# Patient Record
Sex: FEMALE | Race: WHITE | Employment: OTHER | URBAN - METROPOLITAN AREA
[De-identification: names, ages, dates, MRNs, and addresses within clinical notes are randomized per-mention and may not be internally consistent; named-entity substitution may affect disease eponyms.]

---

## 2024-03-31 LAB
LEFT VENTRICULAR EJECTION FRACTION MODE: NORMAL
LV EF: 57 %

## 2024-05-04 ENCOUNTER — APPOINTMENT (OUTPATIENT)
Dept: GENERAL RADIOLOGY | Age: 74
DRG: 683 | End: 2024-05-04
Payer: MEDICARE

## 2024-05-04 ENCOUNTER — APPOINTMENT (OUTPATIENT)
Dept: CT IMAGING | Age: 74
DRG: 683 | End: 2024-05-04
Payer: MEDICARE

## 2024-05-04 ENCOUNTER — APPOINTMENT (OUTPATIENT)
Dept: VASCULAR LAB | Age: 74
DRG: 683 | End: 2024-05-04
Payer: MEDICARE

## 2024-05-04 ENCOUNTER — HOSPITAL ENCOUNTER (INPATIENT)
Age: 74
LOS: 4 days | Discharge: NURSING, REHAB OR LONG-TERM CARE | DRG: 683 | End: 2024-05-08
Attending: EMERGENCY MEDICINE | Admitting: INTERNAL MEDICINE
Payer: MEDICARE

## 2024-05-04 DIAGNOSIS — R60.0 EDEMA OF RIGHT LOWER EXTREMITY: ICD-10-CM

## 2024-05-04 DIAGNOSIS — R60.0 EDEMA OF LEFT LOWER EXTREMITY: ICD-10-CM

## 2024-05-04 DIAGNOSIS — N17.9 AKI (ACUTE KIDNEY INJURY) (HCC): Primary | ICD-10-CM

## 2024-05-04 DIAGNOSIS — I50.33 ACUTE ON CHRONIC DIASTOLIC CONGESTIVE HEART FAILURE (HCC): ICD-10-CM

## 2024-05-04 DIAGNOSIS — J18.9 PNEUMONIA OF RIGHT MIDDLE LOBE DUE TO INFECTIOUS ORGANISM: ICD-10-CM

## 2024-05-04 DIAGNOSIS — A41.9 SEPTICEMIA (HCC): ICD-10-CM

## 2024-05-04 DIAGNOSIS — R60.0 EDEMA OF RIGHT UPPER EXTREMITY: ICD-10-CM

## 2024-05-04 DIAGNOSIS — E87.1 HYPONATREMIA: ICD-10-CM

## 2024-05-04 LAB
ALBUMIN SERPL-MCNC: 4.1 G/DL (ref 3.5–5.2)
ALBUMIN/GLOB SERPL: 2 {RATIO} (ref 1–2.5)
ALP SERPL-CCNC: 100 U/L (ref 35–104)
ALT SERPL-CCNC: 23 U/L (ref 10–35)
ANION GAP SERPL CALCULATED.3IONS-SCNC: 17 MMOL/L (ref 9–16)
ANION GAP SERPL CALCULATED.3IONS-SCNC: 17 MMOL/L (ref 9–16)
ANTI-XA UNFRAC HEPARIN: >2 IU/L
AST SERPL-CCNC: 22 U/L (ref 10–35)
BASOPHILS # BLD: 0 K/UL (ref 0–0.2)
BASOPHILS NFR BLD: 0 % (ref 0–2)
BILIRUB SERPL-MCNC: 0.3 MG/DL (ref 0–1.2)
BILIRUB UR QL STRIP: NEGATIVE
BNP SERPL-MCNC: ABNORMAL PG/ML (ref 0–300)
BODY TEMPERATURE: 37
BUN SERPL-MCNC: 66 MG/DL (ref 8–23)
BUN SERPL-MCNC: 67 MG/DL (ref 8–23)
CALCIUM SERPL-MCNC: 8.6 MG/DL (ref 8.6–10.4)
CALCIUM SERPL-MCNC: 9.1 MG/DL (ref 8.6–10.4)
CASTS #/AREA URNS LPF: NORMAL /LPF (ref 0–2)
CASTS #/AREA URNS LPF: NORMAL /LPF (ref 0–2)
CHLORIDE SERPL-SCNC: 90 MMOL/L (ref 98–107)
CHLORIDE SERPL-SCNC: 95 MMOL/L (ref 98–107)
CLARITY UR: CLEAR
CO2 SERPL-SCNC: 20 MMOL/L (ref 20–31)
CO2 SERPL-SCNC: 22 MMOL/L (ref 20–31)
COHGB MFR BLD: 4.4 % (ref 0–5)
COLOR UR: YELLOW
CREAT SERPL-MCNC: 2.8 MG/DL (ref 0.5–0.9)
CREAT SERPL-MCNC: 3 MG/DL (ref 0.5–0.9)
CREAT UR-MCNC: 61.5 MG/DL (ref 28–217)
EOSINOPHIL # BLD: 0.16 K/UL (ref 0–0.4)
EOSINOPHILS RELATIVE PERCENT: 1 % (ref 1–4)
EPI CELLS #/AREA URNS HPF: NORMAL /HPF (ref 0–5)
ERYTHROCYTE [DISTWIDTH] IN BLOOD BY AUTOMATED COUNT: 15.4 % (ref 11.8–14.4)
ERYTHROCYTE [DISTWIDTH] IN BLOOD BY AUTOMATED COUNT: 15.5 % (ref 11.8–14.4)
FIO2 ON VENT: ABNORMAL %
FLUAV AG SPEC QL: NEGATIVE
FLUBV AG SPEC QL: NEGATIVE
GFR, ESTIMATED: 16 ML/MIN/1.73M2
GFR, ESTIMATED: 17 ML/MIN/1.73M2
GLUCOSE SERPL-MCNC: 114 MG/DL (ref 74–99)
GLUCOSE SERPL-MCNC: 118 MG/DL (ref 74–99)
GLUCOSE UR STRIP-MCNC: NEGATIVE MG/DL
HCO3 VENOUS: 25.1 MMOL/L (ref 24–30)
HCT VFR BLD AUTO: 29 % (ref 36.3–47.1)
HCT VFR BLD AUTO: 30.8 % (ref 36.3–47.1)
HGB BLD-MCNC: 9 G/DL (ref 11.9–15.1)
HGB BLD-MCNC: 9.9 G/DL (ref 11.9–15.1)
HGB UR QL STRIP.AUTO: NEGATIVE
IMM GRANULOCYTES # BLD AUTO: 0 K/UL (ref 0–0.3)
IMM GRANULOCYTES NFR BLD: 0 %
INR PPP: 1.2
INR PPP: 1.3
KETONES UR STRIP-MCNC: NEGATIVE MG/DL
L PNEUMO1 AG UR QL IA.RAPID: NEGATIVE
LACTIC ACID, WHOLE BLOOD: 1.1 MMOL/L (ref 0.7–2.1)
LEUKOCYTE ESTERASE UR QL STRIP: NEGATIVE
LIPASE SERPL-CCNC: 46 U/L (ref 13–60)
LYMPHOCYTES NFR BLD: 1.63 K/UL (ref 1–4.8)
LYMPHOCYTES RELATIVE PERCENT: 10 % (ref 24–44)
MCH RBC QN AUTO: 30.2 PG (ref 25.2–33.5)
MCH RBC QN AUTO: 30.2 PG (ref 25.2–33.5)
MCHC RBC AUTO-ENTMCNC: 31 G/DL (ref 28.4–34.8)
MCHC RBC AUTO-ENTMCNC: 32.1 G/DL (ref 28.4–34.8)
MCV RBC AUTO: 93.9 FL (ref 82.6–102.9)
MCV RBC AUTO: 97.3 FL (ref 82.6–102.9)
MONOCYTES NFR BLD: 1.79 K/UL (ref 0.1–0.8)
MONOCYTES NFR BLD: 11 % (ref 1–7)
MORPHOLOGY: ABNORMAL
NEUTROPHILS NFR BLD: 78 % (ref 36–66)
NEUTS SEG NFR BLD: 12.72 K/UL (ref 1.8–7.7)
NITRITE UR QL STRIP: NEGATIVE
NRBC BLD-RTO: 0 PER 100 WBC
NRBC BLD-RTO: 0 PER 100 WBC
O2 SAT, VEN: 98.1 % (ref 60–85)
OSMOLALITY SERPL: 300 MOSM/KG (ref 275–295)
OSMOLALITY UR: 389 MOSM/KG (ref 80–1300)
PARTIAL THROMBOPLASTIN TIME: 30.2 SEC (ref 23–36.5)
PCO2, VEN: 41.5 MM HG (ref 39–55)
PH UR STRIP: 5.5 [PH] (ref 5–8)
PH VENOUS: 7.4 (ref 7.32–7.42)
PLATELET # BLD AUTO: 310 K/UL (ref 138–453)
PLATELET # BLD AUTO: 347 K/UL (ref 138–453)
PMV BLD AUTO: 10.4 FL (ref 8.1–13.5)
PMV BLD AUTO: 10.7 FL (ref 8.1–13.5)
PO2, VEN: 106 MM HG (ref 30–50)
POSITIVE BASE EXCESS, VEN: 0.8 MMOL/L (ref 0–2)
POTASSIUM SERPL-SCNC: 4 MMOL/L (ref 3.7–5.3)
POTASSIUM SERPL-SCNC: 4.1 MMOL/L (ref 3.7–5.3)
PROT SERPL-MCNC: 6.6 G/DL (ref 6.6–8.7)
PROT UR STRIP-MCNC: ABNORMAL MG/DL
PROTHROMBIN TIME: 15.4 SEC (ref 11.7–14.9)
PROTHROMBIN TIME: 16.2 SEC (ref 11.7–14.9)
RBC # BLD AUTO: 2.98 M/UL (ref 3.95–5.11)
RBC # BLD AUTO: 3.28 M/UL (ref 3.95–5.11)
RBC #/AREA URNS HPF: NORMAL /HPF (ref 0–2)
SARS-COV-2 RDRP RESP QL NAA+PROBE: NOT DETECTED
SODIUM SERPL-SCNC: 129 MMOL/L (ref 136–145)
SODIUM SERPL-SCNC: 132 MMOL/L (ref 136–145)
SODIUM UR-SCNC: <20 MMOL/L
SPECIMEN DESCRIPTION: NORMAL
TROPONIN I SERPL HS-MCNC: 33 NG/L (ref 0–14)
TROPONIN I SERPL HS-MCNC: 34 NG/L (ref 0–14)
TSH SERPL DL<=0.05 MIU/L-ACNC: 3.11 UIU/ML (ref 0.27–4.2)
UROBILINOGEN UR STRIP-ACNC: NORMAL EU/DL (ref 0–1)
WBC #/AREA URNS HPF: NORMAL /HPF (ref 0–5)
WBC OTHER # BLD: 12.1 K/UL (ref 3.5–11.3)
WBC OTHER # BLD: 16.3 K/UL (ref 3.5–11.3)

## 2024-05-04 PROCEDURE — 85520 HEPARIN ASSAY: CPT

## 2024-05-04 PROCEDURE — 96374 THER/PROPH/DIAG INJ IV PUSH: CPT

## 2024-05-04 PROCEDURE — 70450 CT HEAD/BRAIN W/O DYE: CPT

## 2024-05-04 PROCEDURE — 71045 X-RAY EXAM CHEST 1 VIEW: CPT

## 2024-05-04 PROCEDURE — 73610 X-RAY EXAM OF ANKLE: CPT

## 2024-05-04 PROCEDURE — 2580000003 HC RX 258: Performed by: STUDENT IN AN ORGANIZED HEALTH CARE EDUCATION/TRAINING PROGRAM

## 2024-05-04 PROCEDURE — 87635 SARS-COV-2 COVID-19 AMP PRB: CPT

## 2024-05-04 PROCEDURE — 87804 INFLUENZA ASSAY W/OPTIC: CPT

## 2024-05-04 PROCEDURE — 73523 X-RAY EXAM HIPS BI 5/> VIEWS: CPT

## 2024-05-04 PROCEDURE — 72125 CT NECK SPINE W/O DYE: CPT

## 2024-05-04 PROCEDURE — 73630 X-RAY EXAM OF FOOT: CPT

## 2024-05-04 PROCEDURE — 80048 BASIC METABOLIC PNL TOTAL CA: CPT

## 2024-05-04 PROCEDURE — 83690 ASSAY OF LIPASE: CPT

## 2024-05-04 PROCEDURE — 6360000002 HC RX W HCPCS: Performed by: STUDENT IN AN ORGANIZED HEALTH CARE EDUCATION/TRAINING PROGRAM

## 2024-05-04 PROCEDURE — 87449 NOS EACH ORGANISM AG IA: CPT

## 2024-05-04 PROCEDURE — 85025 COMPLETE CBC W/AUTO DIFF WBC: CPT

## 2024-05-04 PROCEDURE — 73590 X-RAY EXAM OF LOWER LEG: CPT

## 2024-05-04 PROCEDURE — 99285 EMERGENCY DEPT VISIT HI MDM: CPT

## 2024-05-04 PROCEDURE — 82805 BLOOD GASES W/O2 SATURATION: CPT

## 2024-05-04 PROCEDURE — 84443 ASSAY THYROID STIM HORMONE: CPT

## 2024-05-04 PROCEDURE — 36415 COLL VENOUS BLD VENIPUNCTURE: CPT

## 2024-05-04 PROCEDURE — 87040 BLOOD CULTURE FOR BACTERIA: CPT

## 2024-05-04 PROCEDURE — 83605 ASSAY OF LACTIC ACID: CPT

## 2024-05-04 PROCEDURE — 84300 ASSAY OF URINE SODIUM: CPT

## 2024-05-04 PROCEDURE — 81001 URINALYSIS AUTO W/SCOPE: CPT

## 2024-05-04 PROCEDURE — 82570 ASSAY OF URINE CREATININE: CPT

## 2024-05-04 PROCEDURE — 85027 COMPLETE CBC AUTOMATED: CPT

## 2024-05-04 PROCEDURE — 93970 EXTREMITY STUDY: CPT | Performed by: STUDENT IN AN ORGANIZED HEALTH CARE EDUCATION/TRAINING PROGRAM

## 2024-05-04 PROCEDURE — 93970 EXTREMITY STUDY: CPT

## 2024-05-04 PROCEDURE — 80053 COMPREHEN METABOLIC PANEL: CPT

## 2024-05-04 PROCEDURE — 93005 ELECTROCARDIOGRAM TRACING: CPT | Performed by: STUDENT IN AN ORGANIZED HEALTH CARE EDUCATION/TRAINING PROGRAM

## 2024-05-04 PROCEDURE — 83935 ASSAY OF URINE OSMOLALITY: CPT

## 2024-05-04 PROCEDURE — 83930 ASSAY OF BLOOD OSMOLALITY: CPT

## 2024-05-04 PROCEDURE — 84484 ASSAY OF TROPONIN QUANT: CPT

## 2024-05-04 PROCEDURE — 85730 THROMBOPLASTIN TIME PARTIAL: CPT

## 2024-05-04 PROCEDURE — 6370000000 HC RX 637 (ALT 250 FOR IP)

## 2024-05-04 PROCEDURE — 2060000000 HC ICU INTERMEDIATE R&B

## 2024-05-04 PROCEDURE — 6360000002 HC RX W HCPCS

## 2024-05-04 PROCEDURE — 85610 PROTHROMBIN TIME: CPT

## 2024-05-04 PROCEDURE — 83880 ASSAY OF NATRIURETIC PEPTIDE: CPT

## 2024-05-04 RX ORDER — ASPIRIN 81 MG/1
81 TABLET ORAL DAILY
COMMUNITY
Start: 2024-04-15 | End: 2024-05-15

## 2024-05-04 RX ORDER — HEPARIN SODIUM 1000 [USP'U]/ML
60 INJECTION, SOLUTION INTRAVENOUS; SUBCUTANEOUS ONCE
Status: DISCONTINUED | OUTPATIENT
Start: 2024-05-04 | End: 2024-05-04

## 2024-05-04 RX ORDER — HEPARIN SODIUM 10000 [USP'U]/100ML
5-30 INJECTION, SOLUTION INTRAVENOUS CONTINUOUS
Status: DISCONTINUED | OUTPATIENT
Start: 2024-05-04 | End: 2024-05-04

## 2024-05-04 RX ORDER — HYDRALAZINE HYDROCHLORIDE 100 MG/1
100 TABLET, FILM COATED ORAL 3 TIMES DAILY
COMMUNITY
Start: 2024-04-16

## 2024-05-04 RX ORDER — CARVEDILOL 25 MG/1
25 TABLET ORAL 2 TIMES DAILY WITH MEALS
COMMUNITY
Start: 2024-04-14

## 2024-05-04 RX ORDER — SODIUM CHLORIDE 0.9 % (FLUSH) 0.9 %
5-40 SYRINGE (ML) INJECTION EVERY 12 HOURS SCHEDULED
Status: DISCONTINUED | OUTPATIENT
Start: 2024-05-04 | End: 2024-05-08 | Stop reason: HOSPADM

## 2024-05-04 RX ORDER — SPIRONOLACTONE 25 MG/1
25 TABLET ORAL DAILY
COMMUNITY
Start: 2024-04-15 | End: 2024-05-15

## 2024-05-04 RX ORDER — SPIRONOLACTONE 25 MG/1
25 TABLET ORAL DAILY
Status: CANCELLED | OUTPATIENT
Start: 2024-05-04 | Stop reason: SINTOL

## 2024-05-04 RX ORDER — IPRATROPIUM BROMIDE AND ALBUTEROL SULFATE 2.5; .5 MG/3ML; MG/3ML
3 SOLUTION RESPIRATORY (INHALATION) 4 TIMES DAILY PRN
COMMUNITY
Start: 2024-03-08

## 2024-05-04 RX ORDER — PENTOXIFYLLINE 400 MG/1
400 TABLET, EXTENDED RELEASE ORAL
COMMUNITY
Start: 2024-04-14

## 2024-05-04 RX ORDER — BUMETANIDE 2 MG/1
3 TABLET ORAL
Status: ON HOLD | COMMUNITY
Start: 2024-04-16 | End: 2024-05-08

## 2024-05-04 RX ORDER — HEPARIN SODIUM 1000 [USP'U]/ML
60 INJECTION, SOLUTION INTRAVENOUS; SUBCUTANEOUS PRN
Status: DISCONTINUED | OUTPATIENT
Start: 2024-05-04 | End: 2024-05-04

## 2024-05-04 RX ORDER — ASPIRIN 81 MG/1
81 TABLET ORAL DAILY
Status: DISCONTINUED | OUTPATIENT
Start: 2024-05-04 | End: 2024-05-08 | Stop reason: HOSPADM

## 2024-05-04 RX ORDER — ONDANSETRON 2 MG/ML
4 INJECTION INTRAMUSCULAR; INTRAVENOUS EVERY 6 HOURS PRN
Status: DISCONTINUED | OUTPATIENT
Start: 2024-05-04 | End: 2024-05-08 | Stop reason: HOSPADM

## 2024-05-04 RX ORDER — HEPARIN SODIUM 1000 [USP'U]/ML
60 INJECTION, SOLUTION INTRAVENOUS; SUBCUTANEOUS ONCE
Status: COMPLETED | OUTPATIENT
Start: 2024-05-04 | End: 2024-05-04

## 2024-05-04 RX ORDER — HEPARIN SODIUM 1000 [USP'U]/ML
30 INJECTION, SOLUTION INTRAVENOUS; SUBCUTANEOUS PRN
Status: DISCONTINUED | OUTPATIENT
Start: 2024-05-04 | End: 2024-05-06

## 2024-05-04 RX ORDER — ACETAMINOPHEN 325 MG/1
650 TABLET ORAL EVERY 6 HOURS PRN
Status: DISCONTINUED | OUTPATIENT
Start: 2024-05-04 | End: 2024-05-08 | Stop reason: HOSPADM

## 2024-05-04 RX ORDER — SODIUM CHLORIDE 9 MG/ML
INJECTION, SOLUTION INTRAVENOUS PRN
Status: DISCONTINUED | OUTPATIENT
Start: 2024-05-04 | End: 2024-05-08 | Stop reason: HOSPADM

## 2024-05-04 RX ORDER — 0.9 % SODIUM CHLORIDE 0.9 %
250 INTRAVENOUS SOLUTION INTRAVENOUS ONCE
Status: COMPLETED | OUTPATIENT
Start: 2024-05-04 | End: 2024-05-04

## 2024-05-04 RX ORDER — POTASSIUM CHLORIDE 7.45 MG/ML
10 INJECTION INTRAVENOUS PRN
Status: DISCONTINUED | OUTPATIENT
Start: 2024-05-04 | End: 2024-05-04

## 2024-05-04 RX ORDER — PHENOL 1.4 %
10 AEROSOL, SPRAY (ML) MUCOUS MEMBRANE NIGHTLY PRN
COMMUNITY

## 2024-05-04 RX ORDER — ACETAMINOPHEN 650 MG/1
650 SUPPOSITORY RECTAL EVERY 6 HOURS PRN
Status: DISCONTINUED | OUTPATIENT
Start: 2024-05-04 | End: 2024-05-08 | Stop reason: HOSPADM

## 2024-05-04 RX ORDER — OXYCODONE HYDROCHLORIDE 5 MG/1
5 TABLET ORAL EVERY 6 HOURS PRN
Status: DISPENSED | OUTPATIENT
Start: 2024-05-04 | End: 2024-05-06

## 2024-05-04 RX ORDER — ATORVASTATIN CALCIUM 40 MG/1
40 TABLET, FILM COATED ORAL DAILY
Status: DISCONTINUED | OUTPATIENT
Start: 2024-05-04 | End: 2024-05-08 | Stop reason: HOSPADM

## 2024-05-04 RX ORDER — IPRATROPIUM BROMIDE AND ALBUTEROL SULFATE 2.5; .5 MG/3ML; MG/3ML
1 SOLUTION RESPIRATORY (INHALATION) EVERY 4 HOURS PRN
Status: DISCONTINUED | OUTPATIENT
Start: 2024-05-04 | End: 2024-05-08 | Stop reason: HOSPADM

## 2024-05-04 RX ORDER — CHOLECALCIFEROL (VITAMIN D3) 125 MCG
10 CAPSULE ORAL NIGHTLY PRN
Status: DISCONTINUED | OUTPATIENT
Start: 2024-05-04 | End: 2024-05-08 | Stop reason: HOSPADM

## 2024-05-04 RX ORDER — SODIUM CHLORIDE 0.9 % (FLUSH) 0.9 %
5-40 SYRINGE (ML) INJECTION PRN
Status: DISCONTINUED | OUTPATIENT
Start: 2024-05-04 | End: 2024-05-08 | Stop reason: HOSPADM

## 2024-05-04 RX ORDER — HEPARIN SODIUM 1000 [USP'U]/ML
30 INJECTION, SOLUTION INTRAVENOUS; SUBCUTANEOUS PRN
Status: DISCONTINUED | OUTPATIENT
Start: 2024-05-04 | End: 2024-05-04

## 2024-05-04 RX ORDER — ONDANSETRON 4 MG/1
4 TABLET, ORALLY DISINTEGRATING ORAL EVERY 8 HOURS PRN
Status: DISCONTINUED | OUTPATIENT
Start: 2024-05-04 | End: 2024-05-08 | Stop reason: HOSPADM

## 2024-05-04 RX ORDER — HEPARIN SODIUM 1000 [USP'U]/ML
60 INJECTION, SOLUTION INTRAVENOUS; SUBCUTANEOUS PRN
Status: DISCONTINUED | OUTPATIENT
Start: 2024-05-04 | End: 2024-05-06

## 2024-05-04 RX ORDER — CYCLOBENZAPRINE HCL 5 MG
5 TABLET ORAL 2 TIMES DAILY PRN
COMMUNITY
Start: 2022-10-26

## 2024-05-04 RX ORDER — POTASSIUM CHLORIDE 20 MEQ/1
40 TABLET, EXTENDED RELEASE ORAL PRN
Status: DISCONTINUED | OUTPATIENT
Start: 2024-05-04 | End: 2024-05-04

## 2024-05-04 RX ORDER — POLYETHYLENE GLYCOL 3350 17 G/17G
17 POWDER, FOR SOLUTION ORAL DAILY PRN
Status: DISCONTINUED | OUTPATIENT
Start: 2024-05-04 | End: 2024-05-08 | Stop reason: HOSPADM

## 2024-05-04 RX ORDER — MAGNESIUM SULFATE IN WATER 40 MG/ML
2000 INJECTION, SOLUTION INTRAVENOUS PRN
Status: DISCONTINUED | OUTPATIENT
Start: 2024-05-04 | End: 2024-05-04

## 2024-05-04 RX ORDER — HEPARIN SODIUM 10000 [USP'U]/100ML
5-30 INJECTION, SOLUTION INTRAVENOUS CONTINUOUS
Status: DISCONTINUED | OUTPATIENT
Start: 2024-05-04 | End: 2024-05-05

## 2024-05-04 RX ORDER — ATORVASTATIN CALCIUM 40 MG/1
40 TABLET, FILM COATED ORAL DAILY
COMMUNITY
Start: 2022-11-04

## 2024-05-04 RX ORDER — CARVEDILOL 25 MG/1
25 TABLET ORAL 2 TIMES DAILY WITH MEALS
Status: DISCONTINUED | OUTPATIENT
Start: 2024-05-04 | End: 2024-05-08 | Stop reason: HOSPADM

## 2024-05-04 RX ADMIN — SODIUM CHLORIDE 250 ML: 9 INJECTION, SOLUTION INTRAVENOUS at 12:41

## 2024-05-04 RX ADMIN — HEPARIN SODIUM 3800 UNITS: 1000 INJECTION INTRAVENOUS; SUBCUTANEOUS at 22:21

## 2024-05-04 RX ADMIN — PIPERACILLIN AND TAZOBACTAM 4500 MG: 4; .5 INJECTION, POWDER, LYOPHILIZED, FOR SOLUTION INTRAVENOUS at 12:43

## 2024-05-04 RX ADMIN — HEPARIN SODIUM 12 UNITS/KG/HR: 10000 INJECTION, SOLUTION INTRAVENOUS at 22:25

## 2024-05-04 RX ADMIN — ATORVASTATIN CALCIUM 40 MG: 40 TABLET, FILM COATED ORAL at 22:50

## 2024-05-04 RX ADMIN — OXYCODONE 5 MG: 5 TABLET ORAL at 19:11

## 2024-05-04 ASSESSMENT — PAIN - FUNCTIONAL ASSESSMENT: PAIN_FUNCTIONAL_ASSESSMENT: 0-10

## 2024-05-04 ASSESSMENT — PAIN DESCRIPTION - DESCRIPTORS: DESCRIPTORS: ACHING;DISCOMFORT

## 2024-05-04 ASSESSMENT — PAIN SCALES - GENERAL
PAINLEVEL_OUTOF10: 10
PAINLEVEL_OUTOF10: 10

## 2024-05-04 ASSESSMENT — PAIN DESCRIPTION - LOCATION
LOCATION: GENERALIZED
LOCATION: GENERALIZED;LEG;BACK

## 2024-05-04 NOTE — ED PROVIDER NOTES
[facility] ED  Emergency Department Encounter  Emergency Medicine Resident     Pt Name:Jo-Ann Kennedy  MRN: [id number]  Birthdate 1950  Date of evaluation: 5/4/24  PCP:  No primary care provider on file.  Note Started: 10:58 AM EDT      CHIEF COMPLAINT       Chief Complaint   Patient presents with    Fatigue    Leg Injury       HISTORY OF PRESENT ILLNESS  (Location/Symptom, Timing/Onset, Context/Setting, Quality, Duration, Modifying Factors, Severity.)      Jo-Ann Kennedy is a 73 y.o. female who presents with left lower leg injury and fatigue.  Patient was recently admitted to [facility] for CHF exacerbation.  She signed out AGAINST MEDICAL ADVICE today.  She was reportedly getting left help to get in to the house by first responders and they noticed some injury to the left lower extremity she was complaining of fatigue and they brought her here to the emergency department here.  She is unsure how she her legs she feels fatigued and short of breath.  She wears 3 L of oxygen at all times.  Denies any cough headache chest pain abdominal pain any head injury.  Patient is on Eliquis for A-fib    PAST MEDICAL / SURGICAL / SOCIAL / FAMILY HISTORY      has a past medical history of CHF (congestive heart failure) ([facility]) and COPD (chronic obstructive pulmonary disease) ([facility]).     has no past surgical history on file.    Social History     Socioeconomic History    Marital status:      Spouse name: Not on file    Number of children: Not on file    Years of education: Not on file    Highest education level: Not on file   Occupational History    Not on file   Tobacco Use    Smoking status: Never    Smokeless tobacco: Not on file   Substance and Sexual Activity    Alcohol use: Never    Drug use: Never    Sexual activity: Not on file   Other Topics Concern    Not on file   Social History Narrative    Not on file     Social Determinants of Health     Financial Resource Strain: Not on file   Food

## 2024-05-04 NOTE — ED PROVIDER NOTES
[facility] ED     Emergency Department     Faculty Attestation        I performed a history and physical examination of the patient and discussed management with the resident. I reviewed the resident’s note and agree with the documented findings and plan of care. Any areas of disagreement are noted on the chart. I was personally present for the key portions of any procedures. I have documented in the chart those procedures where I was not present during the key portions. I have reviewed the emergency nurses triage note. I agree with the chief complaint, past medical history, past surgical history, allergies, medications, social and family history as documented unless otherwise noted below.    For mid-level providers such as nurse practitioners as well as physicians assistants:    I have personally seen and evaluated the patient.    I find the patient's history and physical exam are consistent with NP/PA documentation.  I agree with the care provided, treatment rendered, disposition, & follow-up plan.     Additional findings are as noted.    Vital Signs: BP (!) 136/108   Pulse 73   Temp 98.4 °F (36.9 °C) (Oral)   Resp 20   Wt 63.5 kg (140 lb)   PCP:  No primary care provider on file.    Pertinent Comments:           Critical Care  None          Cj Wang MD    Attending Emergency Medicine Physician            Gilberto Wang MD  05/04/24 [id number]

## 2024-05-04 NOTE — H&P
16.2*   INR 1.3     APTT: No results for input(s): \"APTT\" in the last 72 hours.  CARDIAC ENZYMES: No results for input(s): \"CKMB\", \"CKMBINDEX\", \"TROPONINI\" in the last 72 hours.    Invalid input(s): \"CKTOTAL;3\"  FASTING LIPID PANEL:No results found for: \"CHOL\", \"HDL\", \"TRIG\"  LIVER PROFILE:   Recent Labs     05/04/24  1115   AST 22   ALT 23   BILITOT 0.3   ALKPHOS 100      MICROBIOLOGY:   Lab Results   Component Value Date/Time    CULTURE NO GROWTH <24 HRS 05/04/2024 11:55 AM       Imaging:   CT HEAD WO CONTRAST    Result Date: 5/4/2024  Mild central and cortical cerebral atrophy. Mild chronic deep white matter ischemic changes No acute intracranial abnormalities are noted. Multilevel cervical spondylosis and degenerative disc disease. No acute bony abnormalities are noted in the cervical spine RECOMMENDATIONS: Further evaluation of the cervical spine should be obtained with MR imaging if clinically indicated.     CT CERVICAL SPINE WO CONTRAST    Result Date: 5/4/2024  Mild central and cortical cerebral atrophy. Mild chronic deep white matter ischemic changes No acute intracranial abnormalities are noted. Multilevel cervical spondylosis and degenerative disc disease. No acute bony abnormalities are noted in the cervical spine RECOMMENDATIONS: Further evaluation of the cervical spine should be obtained with MR imaging if clinically indicated.     XR CHEST PORTABLE    Result Date: 5/4/2024  1. Mild venous hypertension with perhaps mild vascular congestion but no pulmonary edema, consolidation or sizable pleural effusion. 2. Subtle ill-defined curvilinear opacity right upper lung, possibly atelectatic.  Minimal infiltrate possible.     XR FOOT LEFT (MIN 3 VIEWS)    Result Date: 5/4/2024  No acute fracture or malalignment. No obvious soft tissue abnormality.     XR ANKLE LEFT (MIN 3 VIEWS)    Result Date: 5/4/2024  Soft tissue swelling without acute osseous abnormality.     XR HIP W PELVIS MIN 5 VWS BILATERAL    Result

## 2024-05-04 NOTE — ED NOTES
Pt to CT   
Pt to ED for fatigue. Pt states she was discharged from [facility] this morning. Pt states when she arrived back to her house she was too weak to walk and called EMS for a lift assist. Pt is complaining of tightness in her chest, SOB, and generalized pain. Pt states, \"I was just laying there and laying there.\" Pt is a poor historian and appears confused about why she came to the hospital but is able to answer orientation questions appropriately. Hx of CHF, CKD stage 4, and COPD. Pt wears 3L nc at home. Pt has a abrasion and bruising to her left lower leg. Patient states she does not know how it got there. Pt denies any falls. Daughter at bedside states the patient hit her leg on the stairs while daughter was helping her get into the house. Pt is on Eliquis. Patient alert and oriented x4, talking in complete sentences. Respirations even and unlabored. Placed on purewick on arrival to ED. Patient placed on continuous cardiac monitoring, BP cuff, and pulse ox. EKG obtained, blood work obtained. Call light in reach, all needs met at this time      
Pt transported to room 504 in stretcher by padmini Lincoln   
The following labs were labeled with appropriate pt sticker and tubed to lab:     [] Blue     [] Lavender   [] on ice  [x] Green/yellow  [] Green/black [] on ice  [] Grey  [] on ice  [] Yellow  [] Red  [] Pink  [] Type/ Screen  [] ABG  [] VBG    [] COVID-19 swab    [] Rapid  [] PCR  [] Flu swab  [] Peds Viral Panel     [] Urine Sample  [] Fecal Sample  [] Pelvic Cultures  [x] Blood Cultures  [] X 2  [] STREP Cultures  [] Wound Cultures   
mL       SURGICAL HISTORY     History reviewed. No pertinent surgical history.    PAST MEDICAL HISTORY       Past Medical History:   Diagnosis Date    CHF (congestive heart failure) ([facility])     COPD (chronic obstructive pulmonary disease) ([facility])        Labs:  Labs Reviewed   CBC WITH AUTO DIFFERENTIAL - Abnormal; Notable for the following components:       Result Value    WBC 16.3 (*)     RBC 3.28 (*)     Hemoglobin 9.9 (*)     Hematocrit 30.8 (*)     RDW 15.5 (*)     Neutrophils % 78 (*)     Lymphocytes % 10 (*)     Monocytes % 11 (*)     Neutrophils Absolute 12.72 (*)     Monocytes Absolute 1.79 (*)     All other components within normal limits   COMPREHENSIVE METABOLIC PANEL - Abnormal; Notable for the following components:    Sodium 129 (*)     Chloride 90 (*)     Anion Gap 17 (*)     Glucose 114 (*)     BUN 67 (*)     Creatinine 3.0 (*)     Est, Glom Filt Rate 16 (*)     All other components within normal limits   TROPONIN - Abnormal; Notable for the following components:    Troponin, High Sensitivity 33 (*)     All other components within normal limits   TROPONIN - Abnormal; Notable for the following components:    Troponin, High Sensitivity 34 (*)     All other components within normal limits   BRAIN NATRIURETIC PEPTIDE - Abnormal; Notable for the following components:    Pro-BNP 26,747 (*)     All other components within normal limits   PROTIME-INR - Abnormal; Notable for the following components:    Protime 16.2 (*)     All other components within normal limits   OSMOLALITY - Abnormal; Notable for the following components:    Serum Osmolality 300 (*)     All other components within normal limits   COVID-19, RAPID   RAPID INFLUENZA A/B ANTIGENS   CULTURE, BLOOD 1   CULTURE, BLOOD 1   LEGIONELLA ANTIGEN, URINE   LACTIC ACID   URINALYSIS WITH REFLEX TO CULTURE   TSH WITH REFLEX   SODIUM, URINE, RANDOM   CREATININE, RANDOM URINE       Electronically signed by RACQUEL TALBERT RN on 5/4/2024 at 1:36 PM

## 2024-05-05 PROBLEM — R42 LIGHTHEADEDNESS: Status: ACTIVE | Noted: 2024-05-05

## 2024-05-05 PROBLEM — I48.0 PAROXYSMAL A-FIB (HCC): Status: ACTIVE | Noted: 2024-05-05

## 2024-05-05 PROBLEM — I50.42 CHRONIC COMBINED SYSTOLIC (CONGESTIVE) AND DIASTOLIC (CONGESTIVE) HEART FAILURE (HCC): Status: ACTIVE | Noted: 2024-05-05

## 2024-05-05 PROBLEM — I95.1 ORTHOSTATIC HYPOTENSION: Status: ACTIVE | Noted: 2024-05-05

## 2024-05-05 PROBLEM — R55 PRE-SYNCOPE: Status: ACTIVE | Noted: 2024-05-05

## 2024-05-05 PROBLEM — E78.5 HLD (HYPERLIPIDEMIA): Status: ACTIVE | Noted: 2024-05-05

## 2024-05-05 PROBLEM — I50.32 CHRONIC DIASTOLIC CHF (CONGESTIVE HEART FAILURE) (HCC): Status: ACTIVE | Noted: 2024-05-05

## 2024-05-05 LAB
ANION GAP SERPL CALCULATED.3IONS-SCNC: 16 MMOL/L (ref 9–16)
ANTI-XA UNFRAC HEPARIN: 1.94 IU/L
ANTI-XA UNFRAC HEPARIN: >2 IU/L
ANTI-XA UNFRAC HEPARIN: >2 IU/L
BASOPHILS # BLD: 0 K/UL (ref 0–0.2)
BASOPHILS NFR BLD: 0 % (ref 0–2)
BUN SERPL-MCNC: 65 MG/DL (ref 8–23)
CALCIUM SERPL-MCNC: 8.6 MG/DL (ref 8.6–10.4)
CHLORIDE SERPL-SCNC: 95 MMOL/L (ref 98–107)
CO2 SERPL-SCNC: 16 MMOL/L (ref 20–31)
CREAT SERPL-MCNC: 2.7 MG/DL (ref 0.5–0.9)
CRP SERPL HS-MCNC: 21.7 MG/L (ref 0–5)
EOSINOPHIL # BLD: 0 K/UL (ref 0–0.4)
EOSINOPHILS RELATIVE PERCENT: 0 % (ref 1–4)
ERYTHROCYTE [DISTWIDTH] IN BLOOD BY AUTOMATED COUNT: 15.5 % (ref 11.8–14.4)
FERRITIN SERPL-MCNC: 808 NG/ML (ref 13–150)
FOLATE SERPL-MCNC: >20 NG/ML (ref 4.8–24.2)
GFR, ESTIMATED: 18 ML/MIN/1.73M2
GLUCOSE SERPL-MCNC: 112 MG/DL (ref 74–99)
HCT VFR BLD AUTO: 29.4 % (ref 36.3–47.1)
HGB BLD-MCNC: 9 G/DL (ref 11.9–15.1)
IMM GRANULOCYTES # BLD AUTO: 0 K/UL (ref 0–0.3)
IMM GRANULOCYTES NFR BLD: 0 %
IRON SATN MFR SERPL: 18 % (ref 20–55)
IRON SERPL-MCNC: 50 UG/DL (ref 37–145)
LYMPHOCYTES NFR BLD: 0.53 K/UL (ref 1–4.8)
LYMPHOCYTES RELATIVE PERCENT: 5 % (ref 24–44)
MCH RBC QN AUTO: 30.8 PG (ref 25.2–33.5)
MCHC RBC AUTO-ENTMCNC: 30.6 G/DL (ref 28.4–34.8)
MCV RBC AUTO: 100.7 FL (ref 82.6–102.9)
MONOCYTES NFR BLD: 1.58 K/UL (ref 0.1–0.8)
MONOCYTES NFR BLD: 15 % (ref 1–7)
MORPHOLOGY: ABNORMAL
NEUTROPHILS NFR BLD: 80 % (ref 36–66)
NEUTS SEG NFR BLD: 8.39 K/UL (ref 1.8–7.7)
NRBC BLD-RTO: 0 PER 100 WBC
PARTIAL THROMBOPLASTIN TIME: 28.2 SEC (ref 23–36.5)
PARTIAL THROMBOPLASTIN TIME: 30.8 SEC (ref 23–36.5)
PARTIAL THROMBOPLASTIN TIME: 31.5 SEC (ref 23–36.5)
PLATELET # BLD AUTO: 272 K/UL (ref 138–453)
PMV BLD AUTO: 10.5 FL (ref 8.1–13.5)
POTASSIUM SERPL-SCNC: 4.2 MMOL/L (ref 3.7–5.3)
RBC # BLD AUTO: 2.92 M/UL (ref 3.95–5.11)
SODIUM SERPL-SCNC: 127 MMOL/L (ref 136–145)
TIBC SERPL-MCNC: 277 UG/DL (ref 250–450)
UNSATURATED IRON BINDING CAPACITY: 227 UG/DL (ref 112–347)
VIT B12 SERPL-MCNC: 759 PG/ML (ref 232–1245)
WBC OTHER # BLD: 10.5 K/UL (ref 3.5–11.3)

## 2024-05-05 PROCEDURE — 6370000000 HC RX 637 (ALT 250 FOR IP)

## 2024-05-05 PROCEDURE — 97166 OT EVAL MOD COMPLEX 45 MIN: CPT

## 2024-05-05 PROCEDURE — 85520 HEPARIN ASSAY: CPT

## 2024-05-05 PROCEDURE — 82607 VITAMIN B-12: CPT

## 2024-05-05 PROCEDURE — 2060000000 HC ICU INTERMEDIATE R&B

## 2024-05-05 PROCEDURE — 97530 THERAPEUTIC ACTIVITIES: CPT

## 2024-05-05 PROCEDURE — 82728 ASSAY OF FERRITIN: CPT

## 2024-05-05 PROCEDURE — 83540 ASSAY OF IRON: CPT

## 2024-05-05 PROCEDURE — 83550 IRON BINDING TEST: CPT

## 2024-05-05 PROCEDURE — 82746 ASSAY OF FOLIC ACID SERUM: CPT

## 2024-05-05 PROCEDURE — 36415 COLL VENOUS BLD VENIPUNCTURE: CPT

## 2024-05-05 PROCEDURE — 99223 1ST HOSP IP/OBS HIGH 75: CPT | Performed by: INTERNAL MEDICINE

## 2024-05-05 PROCEDURE — 85025 COMPLETE CBC W/AUTO DIFF WBC: CPT

## 2024-05-05 PROCEDURE — 80048 BASIC METABOLIC PNL TOTAL CA: CPT

## 2024-05-05 PROCEDURE — 2580000003 HC RX 258: Performed by: INTERNAL MEDICINE

## 2024-05-05 PROCEDURE — 86140 C-REACTIVE PROTEIN: CPT

## 2024-05-05 PROCEDURE — 2580000003 HC RX 258

## 2024-05-05 PROCEDURE — 85730 THROMBOPLASTIN TIME PARTIAL: CPT

## 2024-05-05 RX ORDER — SODIUM CHLORIDE 9 MG/ML
INJECTION, SOLUTION INTRAVENOUS CONTINUOUS
Status: DISCONTINUED | OUTPATIENT
Start: 2024-05-05 | End: 2024-05-05

## 2024-05-05 RX ORDER — SODIUM BICARBONATE 650 MG/1
650 TABLET ORAL 2 TIMES DAILY
Status: DISCONTINUED | OUTPATIENT
Start: 2024-05-05 | End: 2024-05-08 | Stop reason: HOSPADM

## 2024-05-05 RX ORDER — SODIUM CHLORIDE 9 MG/ML
INJECTION, SOLUTION INTRAVENOUS CONTINUOUS
Status: DISCONTINUED | OUTPATIENT
Start: 2024-05-05 | End: 2024-05-06

## 2024-05-05 RX ADMIN — APIXABAN 5 MG: 5 TABLET, FILM COATED ORAL at 20:33

## 2024-05-05 RX ADMIN — ATORVASTATIN CALCIUM 40 MG: 40 TABLET, FILM COATED ORAL at 09:42

## 2024-05-05 RX ADMIN — Medication 10 MG: at 21:46

## 2024-05-05 RX ADMIN — APIXABAN 5 MG: 5 TABLET, FILM COATED ORAL at 11:26

## 2024-05-05 RX ADMIN — OXYCODONE 5 MG: 5 TABLET ORAL at 13:19

## 2024-05-05 RX ADMIN — SODIUM CHLORIDE, PRESERVATIVE FREE 10 ML: 5 INJECTION INTRAVENOUS at 09:42

## 2024-05-05 RX ADMIN — SODIUM CHLORIDE: 9 INJECTION, SOLUTION INTRAVENOUS at 11:35

## 2024-05-05 RX ADMIN — SODIUM CHLORIDE: 9 INJECTION, SOLUTION INTRAVENOUS at 16:05

## 2024-05-05 RX ADMIN — ASPIRIN 81 MG: 81 TABLET, COATED ORAL at 09:48

## 2024-05-05 RX ADMIN — SODIUM BICARBONATE 650 MG: 648 TABLET ORAL at 20:33

## 2024-05-05 RX ADMIN — CARVEDILOL 25 MG: 25 TABLET, FILM COATED ORAL at 16:17

## 2024-05-05 RX ADMIN — CARVEDILOL 25 MG: 25 TABLET, FILM COATED ORAL at 09:41

## 2024-05-05 ASSESSMENT — PAIN DESCRIPTION - LOCATION: LOCATION: LEG;GENERALIZED

## 2024-05-05 ASSESSMENT — PAIN SCALES - GENERAL: PAINLEVEL_OUTOF10: 4

## 2024-05-05 ASSESSMENT — PAIN DESCRIPTION - DESCRIPTORS: DESCRIPTORS: ACHING;BURNING

## 2024-05-05 ASSESSMENT — PAIN DESCRIPTION - ORIENTATION: ORIENTATION: LEFT

## 2024-05-05 NOTE — CARE COORDINATION
Case Management Assessment  Initial Evaluation    Date/Time of Evaluation: 5/5/2024 10:15AM  Assessment Completed by: Jo-Ann Ellis RN    If patient is discharged prior to next notation, then this note serves as note for discharge by case management.    Patient Name: Jo-Ann Kennedy                   YOB: 1950  Diagnosis: Hyponatremia [E87.1]  Septicemia (HCC) [A41.9]  ANTOINETTE (acute kidney injury) (HCC) [N17.9]  Acute on chronic diastolic congestive heart failure (HCC) [I50.33]  Edema of left lower extremity [R60.0]  Edema of right lower extremity [R60.0]  Pneumonia of right middle lobe due to infectious organism [J18.9]                   Date / Time: 5/4/2024 10:55 AM    Patient Admission Status: Inpatient   Readmission Risk (Low < 19, Mod (19-27), High > 27): Readmission Risk Score: 18    Current PCP: Ese Felton MD  PCP verified by CM? (P) Yes (Ese Felton)    Chart Reviewed: Yes      History Provided by: Patient  Patient Orientation: Alert and Oriented, Person, Place, Situation    Patient Cognition: Alert    Hospitalization in the last 30 days (Readmission):  No    If yes, Readmission Assessment in CM Navigator will be completed.    Advance Directives:      Code Status: Full Code   Patient's Primary Decision Maker is:  (has DPOA-daughter Neeta listed, copy in chart)      Discharge Planning:    Patient lives with: (P) Children Type of Home: (P) Skilled Nursing Facility  Primary Care Giver: Self  Patient Support Systems include: Children, Family Members   Current Financial resources: (P) Medicare  Current community resources: (P) Other (Comment) (SNF)  Current services prior to admission: (P) Durable Medical Equipment, Oxygen Therapy, Home Care ([facility])            Current DME: (P) Oxygen Therapy (Comment), Cane, Walker (rollator)            Type of Home Care services:  (P) Nursing Services, PT, RT    ADLS  Prior functional level: (P) Assistance with the following:, Housework, Shopping,

## 2024-05-05 NOTE — PLAN OF CARE
Problem: Discharge Planning  Goal: Discharge to home or other facility with appropriate resources  5/5/2024 0245 by Brittni Pérez RN  Outcome: Progressing  5/4/2024 2005 by Merlyn Simmons RN  Outcome: Progressing     Problem: Pain  Goal: Verbalizes/displays adequate comfort level or baseline comfort level  5/5/2024 0245 by Brittni Pérez RN  Outcome: Progressing  5/4/2024 2005 by Merlyn Simmons RN  Outcome: Progressing     Problem: Skin/Tissue Integrity  Goal: Absence of new skin breakdown  Description: 1.  Monitor for areas of redness and/or skin breakdown  2.  Assess vascular access sites hourly  3.  Every 4-6 hours minimum:  Change oxygen saturation probe site  4.  Every 4-6 hours:  If on nasal continuous positive airway pressure, respiratory therapy assess nares and determine need for appliance change or resting period.  5/5/2024 0245 by Brittni Pérez RN  Outcome: Progressing  5/4/2024 2005 by Merlyn Simmons RN  Outcome: Progressing     Problem: ABCDS Injury Assessment  Goal: Absence of physical injury  5/5/2024 0245 by Brittni Pérez RN  Outcome: Progressing  5/4/2024 2005 by Merlyn Simmons RN  Outcome: Progressing     Problem: Safety - Adult  Goal: Free from fall injury  5/5/2024 0245 by Brittni Pérez RN  Outcome: Progressing  5/4/2024 2005 by Merlyn Simmons RN  Outcome: Progressing

## 2024-05-06 ENCOUNTER — APPOINTMENT (OUTPATIENT)
Dept: VASCULAR LAB | Age: 74
DRG: 683 | End: 2024-05-06
Payer: MEDICARE

## 2024-05-06 ENCOUNTER — APPOINTMENT (OUTPATIENT)
Dept: GENERAL RADIOLOGY | Age: 74
DRG: 683 | End: 2024-05-06
Payer: MEDICARE

## 2024-05-06 PROBLEM — E87.1 HYPONATREMIA: Status: ACTIVE | Noted: 2024-05-06

## 2024-05-06 PROBLEM — R60.0 EDEMA OF RIGHT UPPER EXTREMITY: Status: ACTIVE | Noted: 2024-05-06

## 2024-05-06 PROBLEM — D63.8 ANEMIA OF CHRONIC DISEASE: Status: ACTIVE | Noted: 2024-05-06

## 2024-05-06 LAB
ANION GAP SERPL CALCULATED.3IONS-SCNC: 13 MMOL/L (ref 9–16)
BASOPHILS # BLD: 0.1 K/UL (ref 0–0.2)
BASOPHILS NFR BLD: 1 % (ref 0–2)
BUN SERPL-MCNC: 65 MG/DL (ref 8–23)
CALCIUM SERPL-MCNC: 8.2 MG/DL (ref 8.6–10.4)
CHLORIDE SERPL-SCNC: 97 MMOL/L (ref 98–107)
CO2 SERPL-SCNC: 21 MMOL/L (ref 20–31)
CREAT SERPL-MCNC: 2.5 MG/DL (ref 0.5–0.9)
EKG ATRIAL RATE: 70 BPM
EKG Q-T INTERVAL: 414 MS
EKG QRS DURATION: 128 MS
EKG QTC CALCULATION (BAZETT): 447 MS
EKG R AXIS: -66 DEGREES
EKG T AXIS: 129 DEGREES
EKG VENTRICULAR RATE: 70 BPM
EOSINOPHIL # BLD: 0.4 K/UL (ref 0–0.44)
EOSINOPHILS RELATIVE PERCENT: 4 % (ref 1–4)
ERYTHROCYTE [DISTWIDTH] IN BLOOD BY AUTOMATED COUNT: 15.3 % (ref 11.8–14.4)
GFR, ESTIMATED: 20 ML/MIN/1.73M2
GLUCOSE SERPL-MCNC: 151 MG/DL (ref 74–99)
HCT VFR BLD AUTO: 26.3 % (ref 36.3–47.1)
HGB BLD-MCNC: 8.3 G/DL (ref 11.9–15.1)
IMM GRANULOCYTES # BLD AUTO: 0.2 K/UL (ref 0–0.3)
IMM GRANULOCYTES NFR BLD: 2 %
LYMPHOCYTES NFR BLD: 1 K/UL (ref 1.1–3.7)
LYMPHOCYTES RELATIVE PERCENT: 10 % (ref 24–43)
MCH RBC QN AUTO: 30.7 PG (ref 25.2–33.5)
MCHC RBC AUTO-ENTMCNC: 31.6 G/DL (ref 28.4–34.8)
MCV RBC AUTO: 97.4 FL (ref 82.6–102.9)
MONOCYTES NFR BLD: 1.7 K/UL (ref 0.1–1.2)
MONOCYTES NFR BLD: 17 % (ref 3–12)
MORPHOLOGY: ABNORMAL
NEUTROPHILS NFR BLD: 66 % (ref 36–65)
NEUTS SEG NFR BLD: 6.6 K/UL (ref 1.5–8.1)
NRBC BLD-RTO: 0 PER 100 WBC
PLATELET # BLD AUTO: 266 K/UL (ref 138–453)
PMV BLD AUTO: 10.6 FL (ref 8.1–13.5)
POTASSIUM SERPL-SCNC: 4.3 MMOL/L (ref 3.7–5.3)
RBC # BLD AUTO: 2.7 M/UL (ref 3.95–5.11)
SODIUM SERPL-SCNC: 131 MMOL/L (ref 136–145)
WBC OTHER # BLD: 10 K/UL (ref 3.5–11.3)

## 2024-05-06 PROCEDURE — 6370000000 HC RX 637 (ALT 250 FOR IP)

## 2024-05-06 PROCEDURE — 99232 SBSQ HOSP IP/OBS MODERATE 35: CPT | Performed by: STUDENT IN AN ORGANIZED HEALTH CARE EDUCATION/TRAINING PROGRAM

## 2024-05-06 PROCEDURE — 2060000000 HC ICU INTERMEDIATE R&B

## 2024-05-06 PROCEDURE — 36415 COLL VENOUS BLD VENIPUNCTURE: CPT

## 2024-05-06 PROCEDURE — 71045 X-RAY EXAM CHEST 1 VIEW: CPT

## 2024-05-06 PROCEDURE — 97530 THERAPEUTIC ACTIVITIES: CPT

## 2024-05-06 PROCEDURE — 6360000002 HC RX W HCPCS

## 2024-05-06 PROCEDURE — 85025 COMPLETE CBC W/AUTO DIFF WBC: CPT

## 2024-05-06 PROCEDURE — 93005 ELECTROCARDIOGRAM TRACING: CPT | Performed by: STUDENT IN AN ORGANIZED HEALTH CARE EDUCATION/TRAINING PROGRAM

## 2024-05-06 PROCEDURE — 94640 AIRWAY INHALATION TREATMENT: CPT

## 2024-05-06 PROCEDURE — 94761 N-INVAS EAR/PLS OXIMETRY MLT: CPT

## 2024-05-06 PROCEDURE — 2700000000 HC OXYGEN THERAPY PER DAY

## 2024-05-06 PROCEDURE — 80048 BASIC METABOLIC PNL TOTAL CA: CPT

## 2024-05-06 PROCEDURE — 2580000003 HC RX 258

## 2024-05-06 PROCEDURE — 93970 EXTREMITY STUDY: CPT

## 2024-05-06 PROCEDURE — 97162 PT EVAL MOD COMPLEX 30 MIN: CPT

## 2024-05-06 PROCEDURE — 93970 EXTREMITY STUDY: CPT | Performed by: SURGERY

## 2024-05-06 RX ORDER — BUDESONIDE AND FORMOTEROL FUMARATE DIHYDRATE 160; 4.5 UG/1; UG/1
2 AEROSOL RESPIRATORY (INHALATION)
Status: DISCONTINUED | OUTPATIENT
Start: 2024-05-06 | End: 2024-05-08 | Stop reason: HOSPADM

## 2024-05-06 RX ORDER — IPRATROPIUM BROMIDE AND ALBUTEROL SULFATE 2.5; .5 MG/3ML; MG/3ML
1 SOLUTION RESPIRATORY (INHALATION) ONCE
Status: COMPLETED | OUTPATIENT
Start: 2024-05-06 | End: 2024-05-06

## 2024-05-06 RX ORDER — FENTANYL CITRATE 50 UG/ML
25 INJECTION, SOLUTION INTRAMUSCULAR; INTRAVENOUS ONCE
Status: COMPLETED | OUTPATIENT
Start: 2024-05-06 | End: 2024-05-06

## 2024-05-06 RX ORDER — IPRATROPIUM BROMIDE AND ALBUTEROL SULFATE 2.5; .5 MG/3ML; MG/3ML
1 SOLUTION RESPIRATORY (INHALATION) EVERY 4 HOURS PRN
Status: DISCONTINUED | OUTPATIENT
Start: 2024-05-06 | End: 2024-05-06

## 2024-05-06 RX ORDER — SIMETHICONE 80 MG
80 TABLET,CHEWABLE ORAL EVERY 6 HOURS PRN
Status: DISCONTINUED | OUTPATIENT
Start: 2024-05-06 | End: 2024-05-08 | Stop reason: HOSPADM

## 2024-05-06 RX ADMIN — SODIUM BICARBONATE 650 MG: 648 TABLET ORAL at 09:00

## 2024-05-06 RX ADMIN — ASPIRIN 81 MG: 81 TABLET, COATED ORAL at 09:01

## 2024-05-06 RX ADMIN — IPRATROPIUM BROMIDE AND ALBUTEROL SULFATE 1 DOSE: .5; 2.5 SOLUTION RESPIRATORY (INHALATION) at 08:58

## 2024-05-06 RX ADMIN — DICLOFENAC SODIUM 2 G: 10 GEL TOPICAL at 20:39

## 2024-05-06 RX ADMIN — Medication 10 MG: at 20:44

## 2024-05-06 RX ADMIN — ACETAMINOPHEN 650 MG: 325 TABLET ORAL at 23:25

## 2024-05-06 RX ADMIN — BUDESONIDE AND FORMOTEROL FUMARATE DIHYDRATE 2 PUFF: 160; 4.5 AEROSOL RESPIRATORY (INHALATION) at 08:58

## 2024-05-06 RX ADMIN — BUDESONIDE AND FORMOTEROL FUMARATE DIHYDRATE 2 PUFF: 160; 4.5 AEROSOL RESPIRATORY (INHALATION) at 21:08

## 2024-05-06 RX ADMIN — ACETAMINOPHEN 650 MG: 325 TABLET ORAL at 11:59

## 2024-05-06 RX ADMIN — APIXABAN 5 MG: 5 TABLET, FILM COATED ORAL at 20:39

## 2024-05-06 RX ADMIN — SODIUM BICARBONATE 650 MG: 648 TABLET ORAL at 20:38

## 2024-05-06 RX ADMIN — DICLOFENAC SODIUM 2 G: 10 GEL TOPICAL at 12:00

## 2024-05-06 RX ADMIN — OXYCODONE 5 MG: 5 TABLET ORAL at 16:58

## 2024-05-06 RX ADMIN — ACETAMINOPHEN 650 MG: 325 TABLET ORAL at 04:26

## 2024-05-06 RX ADMIN — SODIUM CHLORIDE, PRESERVATIVE FREE 10 ML: 5 INJECTION INTRAVENOUS at 20:40

## 2024-05-06 RX ADMIN — APIXABAN 5 MG: 5 TABLET, FILM COATED ORAL at 09:00

## 2024-05-06 RX ADMIN — CARVEDILOL 25 MG: 25 TABLET, FILM COATED ORAL at 09:00

## 2024-05-06 RX ADMIN — IPRATROPIUM BROMIDE AND ALBUTEROL SULFATE 1 DOSE: .5; 2.5 SOLUTION RESPIRATORY (INHALATION) at 23:40

## 2024-05-06 RX ADMIN — ATORVASTATIN CALCIUM 40 MG: 40 TABLET, FILM COATED ORAL at 09:00

## 2024-05-06 RX ADMIN — FENTANYL CITRATE 25 MCG: 50 INJECTION, SOLUTION INTRAMUSCULAR; INTRAVENOUS at 23:33

## 2024-05-06 RX ADMIN — CARVEDILOL 25 MG: 25 TABLET, FILM COATED ORAL at 16:59

## 2024-05-06 ASSESSMENT — PAIN - FUNCTIONAL ASSESSMENT
PAIN_FUNCTIONAL_ASSESSMENT: PREVENTS OR INTERFERES SOME ACTIVE ACTIVITIES AND ADLS
PAIN_FUNCTIONAL_ASSESSMENT: PREVENTS OR INTERFERES SOME ACTIVE ACTIVITIES AND ADLS

## 2024-05-06 ASSESSMENT — PAIN SCALES - GENERAL
PAINLEVEL_OUTOF10: 10
PAINLEVEL_OUTOF10: 8
PAINLEVEL_OUTOF10: 7
PAINLEVEL_OUTOF10: 7
PAINLEVEL_OUTOF10: 10

## 2024-05-06 ASSESSMENT — PAIN DESCRIPTION - LOCATION
LOCATION: HEAD
LOCATION: CHEST
LOCATION: LEG
LOCATION: HEAD

## 2024-05-06 ASSESSMENT — PAIN DESCRIPTION - ORIENTATION: ORIENTATION: ANTERIOR;POSTERIOR

## 2024-05-06 ASSESSMENT — PAIN DESCRIPTION - DESCRIPTORS
DESCRIPTORS: ACHING
DESCRIPTORS: ACHING

## 2024-05-06 NOTE — PLAN OF CARE
Problem: Discharge Planning  Goal: Discharge to home or other facility with appropriate resources  Outcome: Progressing     Problem: Pain  Goal: Verbalizes/displays adequate comfort level or baseline comfort level  Outcome: Progressing     Problem: Skin/Tissue Integrity  Goal: Absence of new skin breakdown  Outcome: Progressing     Problem: ABCDS Injury Assessment  Goal: Absence of physical injury  Outcome: Progressing     Problem: Safety - Adult  Goal: Free from fall injury  Outcome: Progressing     Problem: Nutrition Deficit:  Goal: Optimize nutritional status  Outcome: Progressing

## 2024-05-06 NOTE — CARE COORDINATION
Transitional planning: Phone call from Velma with the [facility]. They are accepting. We do precert-await PT note.    1659 Submitted precert via [employer] Auth ID #[id number].

## 2024-05-07 ENCOUNTER — APPOINTMENT (OUTPATIENT)
Dept: GENERAL RADIOLOGY | Age: 74
DRG: 683 | End: 2024-05-07
Payer: MEDICARE

## 2024-05-07 LAB
ANION GAP SERPL CALCULATED.3IONS-SCNC: 15 MMOL/L (ref 9–16)
BASOPHILS # BLD: 0.1 K/UL (ref 0–0.2)
BASOPHILS NFR BLD: 1 % (ref 0–2)
BNP SERPL-MCNC: ABNORMAL PG/ML (ref 0–300)
BUN SERPL-MCNC: 69 MG/DL (ref 8–23)
CALCIUM SERPL-MCNC: 8.4 MG/DL (ref 8.6–10.4)
CHLORIDE SERPL-SCNC: 98 MMOL/L (ref 98–107)
CO2 SERPL-SCNC: 19 MMOL/L (ref 20–31)
CREAT SERPL-MCNC: 2.4 MG/DL (ref 0.5–0.9)
EKG ATRIAL RATE: 64 BPM
EKG ATRIAL RATE: 70 BPM
EKG Q-T INTERVAL: 414 MS
EKG Q-T INTERVAL: 418 MS
EKG QRS DURATION: 128 MS
EKG QRS DURATION: 138 MS
EKG QTC CALCULATION (BAZETT): 447 MS
EKG QTC CALCULATION (BAZETT): 454 MS
EKG R AXIS: -60 DEGREES
EKG R AXIS: -66 DEGREES
EKG T AXIS: 113 DEGREES
EKG T AXIS: 129 DEGREES
EKG VENTRICULAR RATE: 70 BPM
EKG VENTRICULAR RATE: 71 BPM
EOSINOPHIL # BLD: 0.2 K/UL (ref 0–0.4)
EOSINOPHILS RELATIVE PERCENT: 2 % (ref 1–4)
ERYTHROCYTE [DISTWIDTH] IN BLOOD BY AUTOMATED COUNT: 15.7 % (ref 11.8–14.4)
GFR, ESTIMATED: 21 ML/MIN/1.73M2
GLUCOSE SERPL-MCNC: 106 MG/DL (ref 74–99)
HCT VFR BLD AUTO: 26.9 % (ref 36.3–47.1)
HGB BLD-MCNC: 7.9 G/DL (ref 11.9–15.1)
IMM GRANULOCYTES # BLD AUTO: 0.1 K/UL (ref 0–0.3)
IMM GRANULOCYTES NFR BLD: 1 %
LYMPHOCYTES NFR BLD: 1.39 K/UL (ref 1–4.8)
LYMPHOCYTES RELATIVE PERCENT: 14 % (ref 24–44)
MCH RBC QN AUTO: 30.5 PG (ref 25.2–33.5)
MCHC RBC AUTO-ENTMCNC: 29.4 G/DL (ref 28.4–34.8)
MCV RBC AUTO: 103.9 FL (ref 82.6–102.9)
MONOCYTES NFR BLD: 1.09 K/UL (ref 0.1–0.8)
MONOCYTES NFR BLD: 11 % (ref 1–7)
MORPHOLOGY: ABNORMAL
MORPHOLOGY: ABNORMAL
NEUTROPHILS NFR BLD: 71 % (ref 36–66)
NEUTS SEG NFR BLD: 7.02 K/UL (ref 1.8–7.7)
NRBC BLD-RTO: 0 PER 100 WBC
PLATELET # BLD AUTO: 255 K/UL (ref 138–453)
PMV BLD AUTO: 10.7 FL (ref 8.1–13.5)
POTASSIUM SERPL-SCNC: 4.8 MMOL/L (ref 3.7–5.3)
RBC # BLD AUTO: 2.59 M/UL (ref 3.95–5.11)
SODIUM SERPL-SCNC: 132 MMOL/L (ref 136–145)
TROPONIN I SERPL HS-MCNC: 32 NG/L (ref 0–14)
WBC OTHER # BLD: 9.9 K/UL (ref 3.5–11.3)

## 2024-05-07 PROCEDURE — 6370000000 HC RX 637 (ALT 250 FOR IP)

## 2024-05-07 PROCEDURE — 97110 THERAPEUTIC EXERCISES: CPT

## 2024-05-07 PROCEDURE — 84484 ASSAY OF TROPONIN QUANT: CPT

## 2024-05-07 PROCEDURE — 94760 N-INVAS EAR/PLS OXIMETRY 1: CPT

## 2024-05-07 PROCEDURE — 99232 SBSQ HOSP IP/OBS MODERATE 35: CPT | Performed by: STUDENT IN AN ORGANIZED HEALTH CARE EDUCATION/TRAINING PROGRAM

## 2024-05-07 PROCEDURE — 83880 ASSAY OF NATRIURETIC PEPTIDE: CPT

## 2024-05-07 PROCEDURE — 2580000003 HC RX 258

## 2024-05-07 PROCEDURE — 85025 COMPLETE CBC W/AUTO DIFF WBC: CPT

## 2024-05-07 PROCEDURE — 97116 GAIT TRAINING THERAPY: CPT

## 2024-05-07 PROCEDURE — 71045 X-RAY EXAM CHEST 1 VIEW: CPT

## 2024-05-07 PROCEDURE — 1200000000 HC SEMI PRIVATE

## 2024-05-07 PROCEDURE — 80048 BASIC METABOLIC PNL TOTAL CA: CPT

## 2024-05-07 PROCEDURE — 94640 AIRWAY INHALATION TREATMENT: CPT

## 2024-05-07 PROCEDURE — 2700000000 HC OXYGEN THERAPY PER DAY

## 2024-05-07 PROCEDURE — 36415 COLL VENOUS BLD VENIPUNCTURE: CPT

## 2024-05-07 RX ORDER — HYDRALAZINE HYDROCHLORIDE 20 MG/ML
10 INJECTION INTRAMUSCULAR; INTRAVENOUS EVERY 6 HOURS PRN
Status: DISCONTINUED | OUTPATIENT
Start: 2024-05-07 | End: 2024-05-08 | Stop reason: HOSPADM

## 2024-05-07 RX ORDER — OXYCODONE HYDROCHLORIDE 5 MG/1
2.5 TABLET ORAL ONCE
Status: COMPLETED | OUTPATIENT
Start: 2024-05-07 | End: 2024-05-07

## 2024-05-07 RX ORDER — HYDRALAZINE HYDROCHLORIDE 25 MG/1
25 TABLET, FILM COATED ORAL 3 TIMES DAILY
Status: DISCONTINUED | OUTPATIENT
Start: 2024-05-07 | End: 2024-05-08 | Stop reason: HOSPADM

## 2024-05-07 RX ORDER — BUMETANIDE 1 MG/1
1 TABLET ORAL DAILY
Status: DISCONTINUED | OUTPATIENT
Start: 2024-05-08 | End: 2024-05-07

## 2024-05-07 RX ORDER — BUMETANIDE 1 MG/1
1 TABLET ORAL DAILY
Status: DISCONTINUED | OUTPATIENT
Start: 2024-05-07 | End: 2024-05-08

## 2024-05-07 RX ADMIN — IPRATROPIUM BROMIDE AND ALBUTEROL SULFATE 1 DOSE: .5; 2.5 SOLUTION RESPIRATORY (INHALATION) at 14:30

## 2024-05-07 RX ADMIN — CARVEDILOL 25 MG: 25 TABLET, FILM COATED ORAL at 09:54

## 2024-05-07 RX ADMIN — APIXABAN 5 MG: 5 TABLET, FILM COATED ORAL at 21:05

## 2024-05-07 RX ADMIN — APIXABAN 5 MG: 5 TABLET, FILM COATED ORAL at 09:54

## 2024-05-07 RX ADMIN — OXYCODONE 2.5 MG: 5 TABLET ORAL at 18:11

## 2024-05-07 RX ADMIN — OXYCODONE 2.5 MG: 5 TABLET ORAL at 09:55

## 2024-05-07 RX ADMIN — BUDESONIDE AND FORMOTEROL FUMARATE DIHYDRATE 2 PUFF: 160; 4.5 AEROSOL RESPIRATORY (INHALATION) at 10:09

## 2024-05-07 RX ADMIN — BUMETANIDE 1 MG: 1 TABLET ORAL at 18:11

## 2024-05-07 RX ADMIN — ATORVASTATIN CALCIUM 40 MG: 40 TABLET, FILM COATED ORAL at 09:55

## 2024-05-07 RX ADMIN — ASPIRIN 81 MG: 81 TABLET, COATED ORAL at 09:54

## 2024-05-07 RX ADMIN — DICLOFENAC SODIUM 2 G: 10 GEL TOPICAL at 09:54

## 2024-05-07 RX ADMIN — SODIUM BICARBONATE 650 MG: 648 TABLET ORAL at 09:54

## 2024-05-07 RX ADMIN — SODIUM CHLORIDE, PRESERVATIVE FREE 10 ML: 5 INJECTION INTRAVENOUS at 21:05

## 2024-05-07 RX ADMIN — HYDRALAZINE HYDROCHLORIDE 25 MG: 25 TABLET, FILM COATED ORAL at 09:53

## 2024-05-07 RX ADMIN — CARVEDILOL 25 MG: 25 TABLET, FILM COATED ORAL at 18:11

## 2024-05-07 RX ADMIN — SODIUM BICARBONATE 650 MG: 648 TABLET ORAL at 21:05

## 2024-05-07 RX ADMIN — HYDRALAZINE HYDROCHLORIDE 25 MG: 25 TABLET, FILM COATED ORAL at 21:05

## 2024-05-07 RX ADMIN — DICLOFENAC SODIUM 2 G: 10 GEL TOPICAL at 21:05

## 2024-05-07 RX ADMIN — HYDRALAZINE HYDROCHLORIDE 25 MG: 25 TABLET, FILM COATED ORAL at 14:18

## 2024-05-07 RX ADMIN — ACETAMINOPHEN 650 MG: 325 TABLET ORAL at 14:17

## 2024-05-07 RX ADMIN — SODIUM CHLORIDE, PRESERVATIVE FREE 10 ML: 5 INJECTION INTRAVENOUS at 09:54

## 2024-05-07 ASSESSMENT — PAIN DESCRIPTION - LOCATION
LOCATION: BACK;CHEST
LOCATION: HEAD
LOCATION: BACK;CHEST
LOCATION: KNEE

## 2024-05-07 ASSESSMENT — PAIN SCALES - GENERAL
PAINLEVEL_OUTOF10: 8
PAINLEVEL_OUTOF10: 8
PAINLEVEL_OUTOF10: 10
PAINLEVEL_OUTOF10: 10

## 2024-05-07 ASSESSMENT — PAIN DESCRIPTION - ORIENTATION: ORIENTATION: LEFT

## 2024-05-07 NOTE — CARE COORDINATION
[facility] #[id number] remains as a pending status at this time.

## 2024-05-07 NOTE — PLAN OF CARE
Problem: Discharge Planning  Goal: Discharge to home or other facility with appropriate resources  Outcome: Progressing  Flowsheets (Taken 5/6/2024 2030)  Discharge to home or other facility with appropriate resources: Identify barriers to discharge with patient and caregiver     Problem: Pain  Goal: Verbalizes/displays adequate comfort level or baseline comfort level  Outcome: Progressing     Problem: Skin/Tissue Integrity  Goal: Absence of new skin breakdown  Description: 1.  Monitor for areas of redness and/or skin breakdown  2.  Assess vascular access sites hourly  3.  Every 4-6 hours minimum:  Change oxygen saturation probe site  4.  Every 4-6 hours:  If on nasal continuous positive airway pressure, respiratory therapy assess nares and determine need for appliance change or resting period.  Outcome: Progressing     Problem: ABCDS Injury Assessment  Goal: Absence of physical injury  Outcome: Progressing  Flowsheets (Taken 5/6/2024 2000)  Absence of Physical Injury: Implement safety measures based on patient assessment     Problem: Safety - Adult  Goal: Free from fall injury  Outcome: Progressing  Flowsheets (Taken 5/6/2024 2000)  Free From Fall Injury: Instruct family/caregiver on patient safety     Problem: Nutrition Deficit:  Goal: Optimize nutritional status  Outcome: Progressing     Problem: Chronic Conditions and Co-morbidities  Goal: Patient's chronic conditions and co-morbidity symptoms are monitored and maintained or improved  Outcome: Progressing  Flowsheets (Taken 5/6/2024 2030)  Care Plan - Patient's Chronic Conditions and Co-Morbidity Symptoms are Monitored and Maintained or Improved: Monitor and assess patient's chronic conditions and comorbid symptoms for stability, deterioration, or improvement

## 2024-05-08 VITALS
DIASTOLIC BLOOD PRESSURE: 80 MMHG | BODY MASS INDEX: 26.43 KG/M2 | HEART RATE: 79 BPM | OXYGEN SATURATION: 100 % | WEIGHT: 140 LBS | TEMPERATURE: 97.5 F | SYSTOLIC BLOOD PRESSURE: 113 MMHG | RESPIRATION RATE: 21 BRPM | HEIGHT: 61 IN

## 2024-05-08 LAB
ANION GAP SERPL CALCULATED.3IONS-SCNC: 13 MMOL/L (ref 9–16)
BASOPHILS # BLD: 0 K/UL (ref 0–0.2)
BASOPHILS NFR BLD: 0 % (ref 0–2)
BUN SERPL-MCNC: 78 MG/DL (ref 8–23)
CALCIUM SERPL-MCNC: 8.3 MG/DL (ref 8.6–10.4)
CHLORIDE SERPL-SCNC: 98 MMOL/L (ref 98–107)
CO2 SERPL-SCNC: 20 MMOL/L (ref 20–31)
CREAT SERPL-MCNC: 2.5 MG/DL (ref 0.5–0.9)
EOSINOPHIL # BLD: 0 K/UL (ref 0–0.4)
EOSINOPHILS RELATIVE PERCENT: 0 % (ref 1–4)
ERYTHROCYTE [DISTWIDTH] IN BLOOD BY AUTOMATED COUNT: 15.9 % (ref 11.8–14.4)
GFR, ESTIMATED: 20 ML/MIN/1.73M2
GLUCOSE SERPL-MCNC: 101 MG/DL (ref 74–99)
HCT VFR BLD AUTO: 26.5 % (ref 36.3–47.1)
HGB BLD-MCNC: 8.1 G/DL (ref 11.9–15.1)
IMM GRANULOCYTES # BLD AUTO: 0.39 K/UL (ref 0–0.3)
IMM GRANULOCYTES NFR BLD: 4 %
LYMPHOCYTES NFR BLD: 1.55 K/UL (ref 1–4.8)
LYMPHOCYTES RELATIVE PERCENT: 16 % (ref 24–44)
MCH RBC QN AUTO: 30.8 PG (ref 25.2–33.5)
MCHC RBC AUTO-ENTMCNC: 30.6 G/DL (ref 28.4–34.8)
MCV RBC AUTO: 100.8 FL (ref 82.6–102.9)
MONOCYTES NFR BLD: 0.97 K/UL (ref 0.1–0.8)
MONOCYTES NFR BLD: 10 % (ref 1–7)
MORPHOLOGY: ABNORMAL
NEUTROPHILS NFR BLD: 70 % (ref 36–66)
NEUTS SEG NFR BLD: 6.79 K/UL (ref 1.8–7.7)
NRBC BLD-RTO: 0.2 PER 100 WBC
PLATELET # BLD AUTO: 260 K/UL (ref 138–453)
PMV BLD AUTO: 10.5 FL (ref 8.1–13.5)
POTASSIUM SERPL-SCNC: 4.5 MMOL/L (ref 3.7–5.3)
RBC # BLD AUTO: 2.63 M/UL (ref 3.95–5.11)
SODIUM SERPL-SCNC: 131 MMOL/L (ref 136–145)
WBC OTHER # BLD: 9.7 K/UL (ref 3.5–11.3)

## 2024-05-08 PROCEDURE — 36415 COLL VENOUS BLD VENIPUNCTURE: CPT

## 2024-05-08 PROCEDURE — 80048 BASIC METABOLIC PNL TOTAL CA: CPT

## 2024-05-08 PROCEDURE — 6370000000 HC RX 637 (ALT 250 FOR IP)

## 2024-05-08 PROCEDURE — 2580000003 HC RX 258

## 2024-05-08 PROCEDURE — 94761 N-INVAS EAR/PLS OXIMETRY MLT: CPT

## 2024-05-08 PROCEDURE — 94640 AIRWAY INHALATION TREATMENT: CPT

## 2024-05-08 PROCEDURE — 85025 COMPLETE CBC W/AUTO DIFF WBC: CPT

## 2024-05-08 PROCEDURE — 6370000000 HC RX 637 (ALT 250 FOR IP): Performed by: STUDENT IN AN ORGANIZED HEALTH CARE EDUCATION/TRAINING PROGRAM

## 2024-05-08 PROCEDURE — 99232 SBSQ HOSP IP/OBS MODERATE 35: CPT | Performed by: STUDENT IN AN ORGANIZED HEALTH CARE EDUCATION/TRAINING PROGRAM

## 2024-05-08 RX ORDER — BUMETANIDE 1 MG/1
2 TABLET ORAL DAILY
Status: DISCONTINUED | OUTPATIENT
Start: 2024-05-08 | End: 2024-05-08 | Stop reason: HOSPADM

## 2024-05-08 RX ORDER — SODIUM BICARBONATE 650 MG/1
650 TABLET ORAL 2 TIMES DAILY
Status: CANCELLED | OUTPATIENT
Start: 2024-05-08

## 2024-05-08 RX ORDER — BUMETANIDE 2 MG/1
2 TABLET ORAL DAILY
Qty: 30 TABLET | Refills: 1 | Status: SHIPPED | OUTPATIENT
Start: 2024-05-08

## 2024-05-08 RX ORDER — BUMETANIDE 2 MG/1
2 TABLET ORAL
Qty: 30 TABLET | Refills: 1 | Status: CANCELLED | OUTPATIENT
Start: 2024-05-08

## 2024-05-08 RX ADMIN — ACETAMINOPHEN 650 MG: 325 TABLET ORAL at 04:37

## 2024-05-08 RX ADMIN — APIXABAN 5 MG: 5 TABLET, FILM COATED ORAL at 09:01

## 2024-05-08 RX ADMIN — ASPIRIN 81 MG: 81 TABLET, COATED ORAL at 09:01

## 2024-05-08 RX ADMIN — ACETAMINOPHEN 650 MG: 325 TABLET ORAL at 10:27

## 2024-05-08 RX ADMIN — BUDESONIDE AND FORMOTEROL FUMARATE DIHYDRATE 2 PUFF: 160; 4.5 AEROSOL RESPIRATORY (INHALATION) at 09:12

## 2024-05-08 RX ADMIN — HYDRALAZINE HYDROCHLORIDE 25 MG: 25 TABLET, FILM COATED ORAL at 09:01

## 2024-05-08 RX ADMIN — SODIUM CHLORIDE, PRESERVATIVE FREE 5 ML: 5 INJECTION INTRAVENOUS at 09:01

## 2024-05-08 RX ADMIN — SODIUM BICARBONATE 650 MG: 648 TABLET ORAL at 09:01

## 2024-05-08 RX ADMIN — ATORVASTATIN CALCIUM 40 MG: 40 TABLET, FILM COATED ORAL at 09:01

## 2024-05-08 RX ADMIN — BUMETANIDE 2 MG: 1 TABLET ORAL at 09:52

## 2024-05-08 RX ADMIN — DICLOFENAC SODIUM 2 G: 10 GEL TOPICAL at 09:02

## 2024-05-08 RX ADMIN — CARVEDILOL 25 MG: 25 TABLET, FILM COATED ORAL at 09:01

## 2024-05-08 ASSESSMENT — PAIN DESCRIPTION - LOCATION
LOCATION: HEAD
LOCATION: HEAD;LEG

## 2024-05-08 ASSESSMENT — PAIN DESCRIPTION - DESCRIPTORS: DESCRIPTORS: ACHING

## 2024-05-08 ASSESSMENT — PAIN DESCRIPTION - ORIENTATION
ORIENTATION: ANTERIOR
ORIENTATION: LEFT

## 2024-05-08 ASSESSMENT — PAIN SCALES - GENERAL
PAINLEVEL_OUTOF10: 0
PAINLEVEL_OUTOF10: 9

## 2024-05-08 NOTE — PLAN OF CARE
Problem: Discharge Planning  Goal: Discharge to home or other facility with appropriate resources  5/8/2024 1140 by Katy Parrish RN  Outcome: Completed     Problem: Pain  Goal: Verbalizes/displays adequate comfort level or baseline comfort level  5/8/2024 1140 by Katy Parrish RN  Outcome: Completed     Problem: Skin/Tissue Integrity  Goal: Absence of new skin breakdown  Description: 1.  Monitor for areas of redness and/or skin breakdown  2.  Assess vascular access sites hourly  3.  Every 4-6 hours minimum:  Change oxygen saturation probe site  4.  Every 4-6 hours:  If on nasal continuous positive airway pressure, respiratory therapy assess nares and determine need for appliance change or resting period.  5/8/2024 1140 by Katy Parrish RN  Outcome: Completed     Problem: ABCDS Injury Assessment  Goal: Absence of physical injury  5/8/2024 1140 by Katy Parrish RN  Outcome: Completed     Problem: Safety - Adult  Goal: Free from fall injury  5/8/2024 1140 by Katy Parrish RN  Outcome: Completed     Problem: Nutrition Deficit:  Goal: Optimize nutritional status  5/8/2024 1140 by Katy Parrish RN  Outcome: Completed     Problem: Chronic Conditions and Co-morbidities  Goal: Patient's chronic conditions and co-morbidity symptoms are monitored and maintained or improved  5/8/2024 1140 by Katy Parrish RN  Outcome: Completed     Problem: Respiratory - Adult  Goal: Achieves optimal ventilation and oxygenation  5/8/2024 1140 by Katy Parrish RN  Outcome: Completed

## 2024-05-08 NOTE — PROGRESS NOTES
[facility]  Internal Medicine Teaching Residency Program  Inpatient Daily Progress Note  ______________________________________________________________________________    Patient: Jo-Ann Kennedy  YOB: 1950   MRN:[id number]    Acct: [id number]     Room: 0504/0504-01  Admit date: 5/4/2024  Today's date: 05/05/24  Number of days in the hospital: 1    SUBJECTIVE   CC: Fatigue and Leg Injury    Pt examined at bedside. Chart & results reviewed.   -VSS, pt is saturating well on 2 to 3 L O2  -Symptomatically feeling better  -labs reviewed   -no acute events overnight.   -Patient denies headache, vision problems, chest pain  , abdominal pain, changes in bowel habits, constipation, changes in urinary habits, lower extremity swelling, nausea , vomiting, and diarrhea     ROS:  General ROS: Completed and except as mentioned above were negative   HEENT ROS: Completed and except as mentioned above were negative   Allergy and Immunology ROS:  Completed and except as mentioned above were negative  Hematological and Lymphatic ROS:  Completed and except as mentioned above were negative  Respiratory ROS:  Completed and except as mentioned above were negative  Cardiovascular ROS:  Completed and except as mentioned above were negative  Gastrointestinal ROS: Completed and except as mentioned above were negative  Genito-Urinary ROS:  Completed and except as mentioned above were negative  Musculoskeletal ROS:  Completed and except as mentioned above were negative  Neurological ROS:  Completed and except as mentioned above were negative  Skin & Dermatological ROS:  Completed and except as mentioned above were negative  Psychological ROS:  Completed and except as mentioned above were negative  BRIEF HISTORY       The patient is a pleasant 73 y.o. female with a PMHx significant for      A-fib on Eliquis  CKD stage IIIb.  Chronic hypoxic respiratory failure on 3 to 4 L home O2  PAD/chronic 
[facility]  PROGRESS NOTE    Shift date: 5/7/2024   Shift day: Tuesday   Shift # 1    Room # 0504/0504-01   Name: Jo-Ann Kennedy                Jehovah's witness: Protestant   Place of Confucianist:     Referral: Routine Visit    Admit Date & Time: 5/4/2024 10:55 AM    Assessment:  Jo-Ann Kennedy is a 73 y.o. female in the hospital because of Acute Kidney Injury. Upon entering the room writer observes the patient calm and welcoming. She told the  that she was \"tired\" and was thinking about her three adult daughters and the pet. The adult children are in her support system but one of them needs help with her physical conditions now. The patient was also concerned about her pet. She was not happy about her being in the hospitals for \"so long\" and she wants to go home and help, especially one of her daughters who needs help now. She was tearful about the situation. She told me that she does not want to be a burden on her children. I inquired about any help we can provide from the [facility] department. She cried.       Intervention:  Writer introduced self and title as  Writer offered space for her   to express feelings, needs, and concerns and provided a ministry presence. I spoke comforting words to Jo-Ann. She asked for a prayer. I prayed for her and her family.     Outcome:  She was grateful for the visit and for the prayer for her and for her family.     Plan:  Chaplains will remain available to offer spiritual and emotional support as needed.      Electronically signed by Chaplain Liu, on 5/7/2024 at 10:52 AM.  [facility]  [phone]     
Comprehensive Nutrition Assessment    Type and Reason for Visit:  Initial, Positive Nutrition Screen (Weight Loss; Poor Intakes/Appetite)    Nutrition Recommendations/Plan:   Continue current diet.  Will provide Ensure ONS x 1 per day.  Encourage intakes as tolerated.  Monitor labs, weights, and plan of care.     Malnutrition Assessment:  Malnutrition Status:  At risk for malnutrition (05/05/24 1550)    Context:  Acute Illness     Findings of the 6 clinical characteristics of malnutrition:  Energy Intake:  75% or less of estimated energy requirements for 7 or more days  Weight Loss:  No significant weight loss - Weight fluctuations noted - likely due to fluids.     Body Fat Loss:  No significant body fat loss   Muscle Mass Loss:  No significant muscle mass loss  Fluid Accumulation:  No significant fluid accumulation   Strength:  Not Performed    Nutrition Assessment:    Admitted with c/o fatigue, left lower leg injury, and SOB.  PMH: CHF, COPD.  Pt reports having a decreased appetite.  States this morning she ate 50% of her breakfast tray.  Discussed oral supplements - pt willing to try Ensure.  Encouraged pt to sip on supplements.  Pt c/o some nausea today.  Weight fluctuations noted per chart review - likely due to fluids.  Labs reviewed: Na 127 mmol/L, BUN 65 mg/dL, CR 2.7 mg/dL.  Meds reviewed.    Nutrition Related Findings:    Labs/Meds reviewed. Wound Type: None       Current Nutrition Intake & Therapies:    Average Meal Intake: 51-75%  Average Supplements Intake: None Ordered  ADULT DIET; Regular  Additional Calorie Sources:  None    Anthropometric Measures:  Height: 154.9 cm (5' 1\")  Ideal Body Weight (IBW): 105 lbs (48 kg)    Admission Body Weight: 63.5 kg (139 lb 15.9 oz)  Current Body Weight: 63.5 kg (139 lb 15.9 oz), 133.3 % IBW.    Current BMI (kg/m2): 26.5        Weight Adjustment For: No Adjustment                 BMI Categories: Overweight (BMI 25.0-29.9)    Estimated Daily Nutrient 
Congestive Heart Failure Education completed and charted. CHF booklet given. Patient was receptive to education.    Discussed the  importance of medication compliance.    Discussed the importance of a heart healthy diet. Discussed 2000 mg sodium-restricted daily diet.  Patient instructed to limit fluid intake to  1.5 to 2 liters per day.    Patient instructed to weigh self at the same time of each day each morning, reinforced teaching to monitor for 3-5 lb weight increase over 1-2 days notify physician if change noted.      Signs and symptoms of CHF discussed with patient, such as feeling more tired than normal, feeling short of breath, coughing that increases when lying down, sudden weight gain, swelling of the feet, legs or belly.  Patient verbalized understanding to notify physician office if these symptoms occur.  EF 55-60%   
PHARMACY NOTE:    The electrolyte replacement protocol for potassium/magnesium has been discontinued per P&T guidelines because the patient has reduced renal function (CrCl < 30 mL/min).      The patient's most recent potassium & magnesium levels are:  Recent Labs     05/04/24  1115   K 4.0     CrCl cannot be calculated (Unknown ideal weight.).    For patients with decreased renal function (below 30ml/min) needing potassium/magnesium supplementation, please order individual bolus doses with appropriate monitoring.      Please contact the inpatient pharmacy with any concerns.  Thank you.   Jacques Trevizo [facility].5/4/2024  7:57 PM    
Patient discharged to [facility]. Report was called to accepting nurse. All questions answered at this time. Patient transferred to [facility] and taken off unit with EMT. All belongings given to EMT.  
Physical Therapy        Physical Therapy Cancel Note      DATE: 2024    NAME: Jo-Ann Kennedy  MRN: [id number]   : 1950      Patient not seen this date for Physical Therapy due to:    Surgery/Procedure: pt off unit at vascular lab, PT will check back as time allows this PM.       Electronically signed by Jessie Selby PT on 2024 at 11:28 AM     
Physical Therapy  Facility/Department: [address] STEPDOWN  Physical Therapy Daily Treatment Note    Name: Jo-Ann Kennedy  : 1950  MRN: [id number]  Date of Service: 2024    Discharge Recommendations:  Patient would benefit from continued therapy after discharge   PT Equipment Recommendations  Equipment Needed: No      Patient Diagnosis(es): The primary encounter diagnosis was ANTOINETTE (acute kidney injury) ([facility]). Diagnoses of Septicemia ([facility]), Acute on chronic diastolic congestive heart failure (HCC), Pneumonia of right middle lobe due to infectious organism, Hyponatremia, Edema of left lower extremity, Edema of right lower extremity, and Edema of right upper extremity were also pertinent to this visit.  Past Medical History:  has a past medical history of CHF (congestive heart failure) ([facility]) and COPD (chronic obstructive pulmonary disease) ([facility]).  Past Surgical History:  has no past surgical history on file.    Assessment   Body Structures, Functions, Activity Limitations Requiring Skilled Therapeutic Intervention: Decreased functional mobility ;Decreased strength;Increased pain;Decreased posture;Decreased ROM;Decreased endurance;Decreased balance;Decreased cognition  Assessment: Pt ambulated 15 ft. in the room with CGA using a RW while on 3L of O2 via NC. Pt becomes SOB during gait training, which limited ambulation distance. Pt fatigues easily and requires frequent rest breaks t/o session. Pt would benefit from continued PT following discharge to address deficits.  Therapy Prognosis: Good  Decision Making: Medium Complexity  Requires PT Follow-Up: Yes  Activity Tolerance  Activity Tolerance: Patient limited by endurance;Patient limited by fatigue;Patient limited by pain     Plan   Physical Therapy Plan  General Plan:  (5-6x/week)  Current Treatment Recommendations: Strengthening, ROM, Balance training, Endurance training, Safety education & training, Patient/Caregiver education & training, Therapeutic activities, 
grooming  Additional Comments: Pt reports daugher and son in law can assist at discharge if needed. Family typically completes housework, otherwise pt reports IND at baseline with cane use  Vision/Hearing  Vision  Vision: Within Functional Limits  Hearing  Hearing: Within functional limits    Cognition   Orientation  Overall Orientation Status: Impaired  Orientation Level: Disoriented to place;Oriented to situation;Oriented to time;Oriented to person (knew hospital, incorrect name. Pt re-oriented to correct hospital)  Cognition  Overall Cognitive Status: Exceptions  Following Commands: Follows multistep commands with increased time;Follows multistep commands with repitition  Memory: Decreased short term memory;Decreased recall of recent events  Insights: Decreased awareness of deficits  Initiation: Requires cues for some  Sequencing: Requires cues for some     Objective   Gross Assessment  Sensation: Intact     Joint Mobility  ROM RLE: WFL  ROM LLE: WFL  ROM RUE: WFL  ROM LUE: WFL  Strength RLE  Strength RLE: WFL  Strength LLE  Strength LLE: Exception  Comment: L ankle not assessed due to reported pain, knee and hip WFL  Strength RUE  Strength RUE: WFL  Strength LUE  Strength LUE: WFL  Bed mobility  Supine to Sit: Contact guard assistance  Sit to Supine: Contact guard assistance  Bed Mobility Comments: HOB elevated to 50 degrees  Transfers  Sit to Stand: Contact guard assistance  Stand to Sit: Contact guard assistance  Comment: Increased time required to complete due to reported L ankle pain with weightbearing  Ambulation  Surface: Level tile  Device: Rolling Walker  Other Apparatus: O2  Assistance: Contact guard assistance  Gait Deviations: Slow Izabela  Distance: 8ft  Comments: Pt unable to ambulated further distance due to reported L ankle pain and fatigue.  More Ambulation?: No  Stairs/Curb  Stairs?: No     Balance  Posture: Poor  Sitting - Static: Good  Sitting - Dynamic: Good;-  Standing - Static: 
MedicationshydrALAZINE, 10 mg, Q6H PRN  simethicone, 80 mg, Q6H PRN  sodium chloride flush, 5-40 mL, PRN  sodium chloride, , PRN  ondansetron, 4 mg, Q8H PRN   Or  ondansetron, 4 mg, Q6H PRN  polyethylene glycol, 17 g, Daily PRN  acetaminophen, 650 mg, Q6H PRN   Or  acetaminophen, 650 mg, Q6H PRN  melatonin, 10 mg, Nightly PRN  ipratropium 0.5 mg-albuterol 2.5 mg, 1 Dose, Q4H PRN        Diagnostic Labs:  CBC:   Recent Labs     05/06/24  0520 05/07/24  0525 05/08/24  0705   WBC 10.0 9.9 9.7   RBC 2.70* 2.59* 2.63*   HGB 8.3* 7.9* 8.1*   HCT 26.3* 26.9* 26.5*   MCV 97.4 103.9* 100.8   RDW 15.3* 15.7* 15.9*    255 260       BMP:   Recent Labs     05/06/24 0520 05/07/24  0525 05/08/24  0705   * 132* 131*   K 4.3 4.8 4.5   CL 97* 98 98   CO2 21 19* 20   BUN 65* 69* 78*   CREATININE 2.5* 2.4* 2.5*       BNP: No results for input(s): \"BNP\" in the last 72 hours.  PT/INR:   No results for input(s): \"PROTIME\", \"INR\" in the last 72 hours.    APTT:   Recent Labs     05/05/24  1254 05/05/24  1859   APTT 31.5 30.8       CARDIAC ENZYMES: No results for input(s): \"CKMB\", \"CKMBINDEX\", \"TROPONINI\" in the last 72 hours.    Invalid input(s): \"CKTOTAL;3\"  FASTING LIPID PANEL:No results found for: \"CHOL\", \"HDL\", \"TRIG\"  LIVER PROFILE:   No results for input(s): \"AST\", \"ALT\", \"BILIDIR\", \"BILITOT\", \"ALKPHOS\" in the last 72 hours.    Invalid input(s): \"ALB\"     MICROBIOLOGY:   Lab Results   Component Value Date/Time    CULTURE NO GROWTH 3 DAYS 05/04/2024 11:55 AM       Imaging:    CT HEAD WO CONTRAST    Result Date: 5/4/2024  Mild central and cortical cerebral atrophy. Mild chronic deep white matter ischemic changes No acute intracranial abnormalities are noted. Multilevel cervical spondylosis and degenerative disc disease. No acute bony abnormalities are noted in the cervical spine RECOMMENDATIONS: Further evaluation of the cervical spine should be obtained with MR imaging if clinically indicated.     CT CERVICAL SPINE WO 
Minutes       Leyda Perez, OTR/L     
acute bony abnormalities are noted in the cervical spine RECOMMENDATIONS: Further evaluation of the cervical spine should be obtained with MR imaging if clinically indicated.     CT CERVICAL SPINE WO CONTRAST    Result Date: 5/4/2024  Mild central and cortical cerebral atrophy. Mild chronic deep white matter ischemic changes No acute intracranial abnormalities are noted. Multilevel cervical spondylosis and degenerative disc disease. No acute bony abnormalities are noted in the cervical spine RECOMMENDATIONS: Further evaluation of the cervical spine should be obtained with MR imaging if clinically indicated.     XR CHEST PORTABLE    Result Date: 5/4/2024  1. Mild venous hypertension with perhaps mild vascular congestion but no pulmonary edema, consolidation or sizable pleural effusion. 2. Subtle ill-defined curvilinear opacity right upper lung, possibly atelectatic.  Minimal infiltrate possible.     XR FOOT LEFT (MIN 3 VIEWS)    Result Date: 5/4/2024  No acute fracture or malalignment. No obvious soft tissue abnormality.     XR ANKLE LEFT (MIN 3 VIEWS)    Result Date: 5/4/2024  Soft tissue swelling without acute osseous abnormality.     XR HIP W PELVIS MIN 5 VWS BILATERAL    Result Date: 5/4/2024  No acute osseous or soft tissue abnormality.     XR TIBIA FIBULA LEFT (2 VIEWS)    Result Date: 5/4/2024  No acute osseous or soft tissue abnormality.       ASSESSMENT & PLAN     Principal Problem:    ANTOINETTE (acute kidney injury) ([facility])  Active Problems:    Pre-syncope    Chronic diastolic CHF (congestive heart failure) ([facility])    Orthostatic hypotension    Paroxysmal A-fib (HCC)    HLD (hyperlipidemia)    Anemia of chronic disease  Resolved Problems:    * No resolved hospital problems. *      IMPRESSION  Patient is a 73 old female with past medical history significant for CKD stage IIIb, HFpEF, A-fib on Eliquis, CHB and PPM, chronic respiratory failure on home O2, CAD s/p PCI presented to the hospital with shortness of breath 
mmHg .  No overt signs of decompensation currently.        #Primary hypertension:  Patient was on hydralazine 100 Mg 3 times daily and Coreg 25 twice daily at home.  Resume Coreg 25 Mg twice daily, started on hydralazine 25 TID.  Monitor blood pressures.  Resume hydralazine if needed.     #Paroxysmal atrial fibrillation:  Patient takes Eliquis at home. Currently rate controlled.  Will hold Eliquis and start the patient on heparin infusion.  Continue Coreg 25.  Telemetry monitoring.     #CAD s/p PCI:  Patient currently not having any chest pain.  Troponins stable.  Continue ASA 81 and Lipitor 40.  Telemetry monitoring.  Lexiscan stress test on 4/12/2024: Low to moderate risk.     #Complete heart block/AV genie ablation on permanent pacemaker:  Currently in paced rhythm.  Telemetry monitoring.     #Dyslipidemia:  Continue Lipitor.     #Anemia:  Hemoglobin currently 9.9. Baseline around 14 in January.   MCV 93.9, RDW 15.5.  Check iron profile, B12 and folic acid levels.     #Abdominal aortic aneurysm:  Found to have 4.7 cm AAA.  Asymptomatic.  Outpatient follow-up.     #Chronic back pain s/p spinal stimulator: Analgesics as needed.      DVT ppx: Eliquis  GI ppx: Not indicated     PT/OT/SW: Consulted  Discharge Planning: Precert pending      Evie Pascal MD  PGY-1, Internal Medicine Resident  [facility] [place]         5/7/2024, 8:06 AM

## 2024-05-08 NOTE — DISCHARGE INSTR - COC
Treatments After Discharge: BMP in 1 week    Physician Certification: I certify the above information and transfer of Jo-Ann Kennedy  is necessary for the continuing treatment of the diagnosis listed and that she requires Skilled Nursing Facility for less 30 days.     Update Admission H&P: No change in H&P    PHYSICIAN SIGNATURE:  Electronically signed by Kristal Brito MD   on 5/8/24 at 11:35 AM EDT

## 2024-05-08 NOTE — PLAN OF CARE
Problem: Discharge Planning  Goal: Discharge to home or other facility with appropriate resources  Outcome: Progressing     Problem: Pain  Goal: Verbalizes/displays adequate comfort level or baseline comfort level  Outcome: Progressing     Problem: Skin/Tissue Integrity  Goal: Absence of new skin breakdown  Description: 1.  Monitor for areas of redness and/or skin breakdown  2.  Assess vascular access sites hourly  3.  Every 4-6 hours minimum:  Change oxygen saturation probe site  4.  Every 4-6 hours:  If on nasal continuous positive airway pressure, respiratory therapy assess nares and determine need for appliance change or resting period.  Outcome: Progressing     Problem: ABCDS Injury Assessment  Goal: Absence of physical injury  Outcome: Progressing     Problem: Safety - Adult  Goal: Free from fall injury  Outcome: Progressing     Problem: Nutrition Deficit:  Goal: Optimize nutritional status  Outcome: Progressing     Problem: Chronic Conditions and Co-morbidities  Goal: Patient's chronic conditions and co-morbidity symptoms are monitored and maintained or improved  Outcome: Progressing     Problem: Respiratory - Adult  Goal: Achieves optimal ventilation and oxygenation  5/8/2024 0018 by Maris Adams, RN  Outcome: Progressing  5/7/2024 1231 by Samara Crawford RCP  Outcome: Progressing

## 2024-05-08 NOTE — CARE COORDINATION
Transitional planning      Writer accessed [facility] auth approved, call to [facility], spoke with garrett Carreon to come today, writer to room to discuss with patient and daughter, agreeable to plan. Transport request faxed to [facility] for first available. Provided RN number for report.        1100 call from [facility], trip booked for 12, patient updated , called daughter left , RN updated. PS sent to MD for d/c order and to complete [facility].      1145 HENS completed, copy in packet, faxed this , AVS and auth form to facility.

## 2024-05-08 NOTE — DISCHARGE INSTRUCTIONS
If you begin to experience any symptoms such as chest pain shortness of breath nausea vomiting dizziness drowsiness abdominal pain loss of consciousness or any other symptoms you find concerning please come to the ED for follow-up evaluation.    If you have been given medication please take them as prescribed. Do not take more medication than recommended at any given time.     Please follow-up with your primary care provider within 5 to 7 days for continued care.     Please feel free return to the hospital if your symptoms worsen or any new concerning symptoms develop.  Follow-up with your primary care physician as needed for all other the concerns.      Please start taking Bumex 2 mg daily

## 2024-05-09 LAB
MICROORGANISM SPEC CULT: NORMAL
MICROORGANISM SPEC CULT: NORMAL
SERVICE CMNT-IMP: NORMAL
SERVICE CMNT-IMP: NORMAL
SPECIMEN DESCRIPTION: NORMAL
SPECIMEN DESCRIPTION: NORMAL

## 2024-05-10 ENCOUNTER — HOSPITAL ENCOUNTER (OUTPATIENT)
Age: 74
Setting detail: SPECIMEN
Discharge: HOME | End: 2024-05-10

## 2024-05-10 LAB
ANION GAP SERPL CALCULATED.3IONS-SCNC: 12 MMOL/L (ref 9–17)
BUN SERPL-MCNC: 68 MG/DL (ref 8–23)
BUN/CREAT SERPL: 34 (ref 9–20)
CALCIUM SERPL-MCNC: 8.7 MG/DL (ref 8.6–10.4)
CHLORIDE SERPL-SCNC: 103 MMOL/L (ref 98–107)
CO2 SERPL-SCNC: 23 MMOL/L (ref 20–31)
CREAT SERPL-MCNC: 2 MG/DL (ref 0.5–0.9)
ERYTHROCYTE [DISTWIDTH] IN BLOOD BY AUTOMATED COUNT: 16.3 % (ref 11.8–14.4)
GFR, ESTIMATED: 26 ML/MIN/1.73M2
GLUCOSE SERPL-MCNC: 96 MG/DL (ref 70–99)
HCT VFR BLD AUTO: 25.9 % (ref 36.3–47.1)
HGB BLD-MCNC: 8.2 G/DL (ref 11.9–15.1)
MCH RBC QN AUTO: 30.9 PG (ref 25.2–33.5)
MCHC RBC AUTO-ENTMCNC: 31.7 G/DL (ref 28.4–34.8)
MCV RBC AUTO: 97.7 FL (ref 82.6–102.9)
NRBC BLD-RTO: 0.2 PER 100 WBC
PLATELET # BLD AUTO: 278 K/UL (ref 138–453)
PMV BLD AUTO: 10.6 FL (ref 8.1–13.5)
POTASSIUM SERPL-SCNC: 3.8 MMOL/L (ref 3.7–5.3)
RBC # BLD AUTO: 2.65 M/UL (ref 3.95–5.11)
SODIUM SERPL-SCNC: 138 MMOL/L (ref 135–144)
WBC OTHER # BLD: 9.2 K/UL (ref 3.5–11.3)

## 2024-05-10 PROCEDURE — 85027 COMPLETE CBC AUTOMATED: CPT

## 2024-05-10 PROCEDURE — P9603 ONE-WAY ALLOW PRORATED MILES: HCPCS

## 2024-05-10 PROCEDURE — 80048 BASIC METABOLIC PNL TOTAL CA: CPT

## 2024-05-10 PROCEDURE — 36415 COLL VENOUS BLD VENIPUNCTURE: CPT

## 2024-05-11 NOTE — DISCHARGE SUMMARY
[facility]     Department of Internal Medicine - Staff Internal Medicine Teaching Service    INPATIENT DISCHARGE SUMMARY      Patient Identification:  Jo-Ann Kennedy is a 73 y.o. female.  :  1950  MRN: [id number]     Acct: [id number]   PCP: Ese Felton MD  Admit Date:  2024  Discharge date and time: 2024 12:34 PM   Attending Provider: No att. providers found                                     ACTIVE DISCHARGE DIAGNOSES     Hospital Problem Lists:  Principal Problem:    ANTOINETTE (acute kidney injury) ([facility])  Active Problems:    Pre-syncope    Chronic diastolic CHF (congestive heart failure) ([facility])    Orthostatic hypotension    Paroxysmal A-fib (HCC)    HLD (hyperlipidemia)    Anemia of chronic disease    Edema of right upper extremity    Hyponatremia  Resolved Problems:    * No resolved hospital problems. *      HOSPITAL STAY     Brief Inpatient course:   The patient is a pleasant 73 y.o. female with a PMHx significant for      A-fib on Eliquis  CKD stage IIIb.  Chronic hypoxic respiratory failure on 3 to 4 L home O2  PAD/chronic venous insufficiency  Abdominal aortic aneurysm.  COPD  HFpEF  Hypertension  CAD s/p PCI 2018  CHB/AV genie ablation s/p PPM  Hyperlipidemia.     presents with a chief complaint of shortness of breath and feeling fatigued.  Patient states she has been admitted in [facility] with combined around 27 days.  Patient left lower hospital AMA today.  Patient was about to be dropped off at home, but she felt fatigued and had pain in the left lower extremity.  EMS brought her to the emergency department.  Patient states she was not satisfied with the care provided at the previous hospital and she left AMA and wanted to come here.  Patient states she has been feeling more short of breath for the last 1 month.  Shortness of breath on exertion, on lying flat.  Patient states that she uses oxygen at home around 3 to 4 L.  Patient states she

## 2024-05-13 ENCOUNTER — HOSPITAL ENCOUNTER (OUTPATIENT)
Age: 74
Setting detail: SPECIMEN
Discharge: HOME | End: 2024-05-13

## 2024-05-13 LAB
ANION GAP SERPL CALCULATED.3IONS-SCNC: 15 MMOL/L (ref 9–17)
BUN SERPL-MCNC: 53 MG/DL (ref 8–23)
BUN/CREAT SERPL: 27 (ref 9–20)
CALCIUM SERPL-MCNC: 9 MG/DL (ref 8.6–10.4)
CHLORIDE SERPL-SCNC: 98 MMOL/L (ref 98–107)
CO2 SERPL-SCNC: 24 MMOL/L (ref 20–31)
CREAT SERPL-MCNC: 2 MG/DL (ref 0.5–0.9)
ERYTHROCYTE [DISTWIDTH] IN BLOOD BY AUTOMATED COUNT: 16.9 % (ref 11.8–14.4)
GFR, ESTIMATED: 26 ML/MIN/1.73M2
GLUCOSE SERPL-MCNC: 101 MG/DL (ref 70–99)
HCT VFR BLD AUTO: 29 % (ref 36.3–47.1)
HGB BLD-MCNC: 8.9 G/DL (ref 11.9–15.1)
MCH RBC QN AUTO: 30.7 PG (ref 25.2–33.5)
MCHC RBC AUTO-ENTMCNC: 30.7 G/DL (ref 28.4–34.8)
MCV RBC AUTO: 100 FL (ref 82.6–102.9)
NRBC BLD-RTO: 0 PER 100 WBC
PLATELET # BLD AUTO: 275 K/UL (ref 138–453)
PMV BLD AUTO: 11.1 FL (ref 8.1–13.5)
POTASSIUM SERPL-SCNC: 3.9 MMOL/L (ref 3.7–5.3)
RBC # BLD AUTO: 2.9 M/UL (ref 3.95–5.11)
SODIUM SERPL-SCNC: 137 MMOL/L (ref 135–144)
WBC OTHER # BLD: 12 K/UL (ref 3.5–11.3)

## 2024-05-13 PROCEDURE — 85027 COMPLETE CBC AUTOMATED: CPT

## 2024-05-13 PROCEDURE — 80048 BASIC METABOLIC PNL TOTAL CA: CPT

## 2024-05-13 PROCEDURE — P9603 ONE-WAY ALLOW PRORATED MILES: HCPCS

## 2024-05-13 PROCEDURE — 36415 COLL VENOUS BLD VENIPUNCTURE: CPT

## 2024-09-10 ENCOUNTER — HOSPITAL ENCOUNTER (INPATIENT)
Age: 74
LOS: 10 days | Discharge: NURSING, REHAB OR LONG-TERM CARE | End: 2024-09-20
Attending: EMERGENCY MEDICINE | Admitting: SURGERY
Payer: MEDICARE

## 2024-09-10 ENCOUNTER — APPOINTMENT (OUTPATIENT)
Dept: GENERAL RADIOLOGY | Age: 74
End: 2024-09-10

## 2024-09-10 DIAGNOSIS — J43.8 OTHER EMPHYSEMA (HCC): ICD-10-CM

## 2024-09-10 DIAGNOSIS — E87.1 HYPONATREMIA: ICD-10-CM

## 2024-09-10 DIAGNOSIS — R51.9 ACUTE NONINTRACTABLE HEADACHE, UNSPECIFIED HEADACHE TYPE: ICD-10-CM

## 2024-09-10 DIAGNOSIS — I50.9 ACUTE ON CHRONIC CONGESTIVE HEART FAILURE, UNSPECIFIED HEART FAILURE TYPE (HCC): ICD-10-CM

## 2024-09-10 DIAGNOSIS — T14.90XA INHALATION INJURY: Primary | ICD-10-CM

## 2024-09-10 DIAGNOSIS — T24.212A PARTIAL THICKNESS BURN OF LEFT THIGH, INITIAL ENCOUNTER: ICD-10-CM

## 2024-09-10 PROBLEM — Z96.89 STATUS POST INSERTION OF SPINAL CORD STIMULATOR: Status: ACTIVE | Noted: 2024-09-10

## 2024-09-10 PROBLEM — Z95.0 PRESENCE OF CARDIAC PACEMAKER: Status: ACTIVE | Noted: 2024-09-10

## 2024-09-10 PROBLEM — T30.0 BURN: Status: ACTIVE | Noted: 2024-09-10

## 2024-09-10 PROBLEM — I10 HYPERTENSION: Status: ACTIVE | Noted: 2024-09-10

## 2024-09-10 PROBLEM — N18.4 CKD (CHRONIC KIDNEY DISEASE) STAGE 4, GFR 15-29 ML/MIN (HCC): Status: ACTIVE | Noted: 2024-09-10

## 2024-09-10 PROBLEM — M19.90 DJD (DEGENERATIVE JOINT DISEASE): Status: ACTIVE | Noted: 2024-09-10

## 2024-09-10 PROBLEM — J96.11 CHRONIC HYPOXEMIC RESPIRATORY FAILURE: Status: ACTIVE | Noted: 2024-09-10

## 2024-09-10 LAB
25(OH)D3 SERPL-MCNC: 23.7 NG/ML (ref 30–100)
ALBUMIN SERPL-MCNC: 3.7 G/DL (ref 3.5–5.2)
ANION GAP SERPL CALCULATED.3IONS-SCNC: 16 MMOL/L (ref 9–16)
BASOPHILS # BLD: 0.06 K/UL (ref 0–0.2)
BASOPHILS NFR BLD: 1 % (ref 0–2)
BNP SERPL-MCNC: ABNORMAL PG/ML (ref 0–300)
BODY TEMPERATURE: 37
BUN SERPL-MCNC: 45 MG/DL (ref 8–23)
CALCIUM SERPL-MCNC: 9 MG/DL (ref 8.6–10.4)
CHLORIDE SERPL-SCNC: 96 MMOL/L (ref 98–107)
CO2 SERPL-SCNC: 22 MMOL/L (ref 20–31)
COHGB MFR BLD: 3.4 % (ref 0–5)
CREAT SERPL-MCNC: 2.3 MG/DL (ref 0.5–0.9)
EOSINOPHIL # BLD: 0.12 K/UL (ref 0–0.44)
EOSINOPHILS RELATIVE PERCENT: 1 % (ref 1–4)
ERYTHROCYTE [DISTWIDTH] IN BLOOD BY AUTOMATED COUNT: 15.9 % (ref 11.8–14.4)
EST. AVERAGE GLUCOSE BLD GHB EST-MCNC: 120 MG/DL
FIO2 ON VENT: ABNORMAL %
FOLATE SERPL-MCNC: 18 NG/ML (ref 4.8–24.2)
GFR, ESTIMATED: 22 ML/MIN/1.73M2
GLUCOSE BLD-MCNC: 136 MG/DL (ref 65–105)
GLUCOSE SERPL-MCNC: 128 MG/DL (ref 74–99)
HBA1C MFR BLD: 5.8 % (ref 4–6)
HCO3 VENOUS: 24.8 MMOL/L (ref 24–30)
HCT VFR BLD AUTO: 37.3 % (ref 36.3–47.1)
HGB BLD-MCNC: 12.1 G/DL (ref 11.9–15.1)
IMM GRANULOCYTES # BLD AUTO: 0.04 K/UL (ref 0–0.3)
IMM GRANULOCYTES NFR BLD: 0 %
LYMPHOCYTES NFR BLD: 0.9 K/UL (ref 1.1–3.7)
LYMPHOCYTES RELATIVE PERCENT: 7 % (ref 24–43)
MCH RBC QN AUTO: 28.3 PG (ref 25.2–33.5)
MCHC RBC AUTO-ENTMCNC: 32.4 G/DL (ref 28.4–34.8)
MCV RBC AUTO: 87.1 FL (ref 82.6–102.9)
MONOCYTES NFR BLD: 1.3 K/UL (ref 0.1–1.2)
MONOCYTES NFR BLD: 10 % (ref 3–12)
NEGATIVE BASE EXCESS, VEN: 0.6 MMOL/L (ref 0–2)
NEUTROPHILS NFR BLD: 81 % (ref 36–65)
NEUTS SEG NFR BLD: 10.53 K/UL (ref 1.5–8.1)
NRBC BLD-RTO: 0 PER 100 WBC
O2 SAT, VEN: 85.7 % (ref 60–85)
PCO2 VENOUS: 46.3 MM HG (ref 39–55)
PH VENOUS: 7.35 (ref 7.32–7.42)
PLATELET # BLD AUTO: 299 K/UL (ref 138–453)
PMV BLD AUTO: 11.6 FL (ref 8.1–13.5)
PO2 VENOUS: 55.5 MM HG (ref 30–50)
POTASSIUM SERPL-SCNC: 4.3 MMOL/L (ref 3.7–5.3)
RBC # BLD AUTO: 4.28 M/UL (ref 3.95–5.11)
RBC # BLD: ABNORMAL 10*6/UL
SODIUM SERPL-SCNC: 134 MMOL/L (ref 136–145)
T4 FREE SERPL-MCNC: 1.3 NG/DL (ref 0.92–1.68)
TROPONIN I SERPL HS-MCNC: 38 NG/L (ref 0–14)
TSH SERPL DL<=0.05 MIU/L-ACNC: 2.09 UIU/ML (ref 0.27–4.2)
VIT B12 SERPL-MCNC: >2000 PG/ML (ref 232–1245)
WBC OTHER # BLD: 13 K/UL (ref 3.5–11.3)

## 2024-09-10 PROCEDURE — G0378 HOSPITAL OBSERVATION PER HR: HCPCS

## 2024-09-10 PROCEDURE — 84484 ASSAY OF TROPONIN QUANT: CPT

## 2024-09-10 PROCEDURE — 2580000003 HC RX 258: Performed by: STUDENT IN AN ORGANIZED HEALTH CARE EDUCATION/TRAINING PROGRAM

## 2024-09-10 PROCEDURE — 82746 ASSAY OF FOLIC ACID SERUM: CPT

## 2024-09-10 PROCEDURE — 84439 ASSAY OF FREE THYROXINE: CPT

## 2024-09-10 PROCEDURE — 84443 ASSAY THYROID STIM HORMONE: CPT

## 2024-09-10 PROCEDURE — 83036 HEMOGLOBIN GLYCOSYLATED A1C: CPT

## 2024-09-10 PROCEDURE — 6370000000 HC RX 637 (ALT 250 FOR IP)

## 2024-09-10 PROCEDURE — 82306 VITAMIN D 25 HYDROXY: CPT

## 2024-09-10 PROCEDURE — 82947 ASSAY GLUCOSE BLOOD QUANT: CPT

## 2024-09-10 PROCEDURE — 71045 X-RAY EXAM CHEST 1 VIEW: CPT

## 2024-09-10 PROCEDURE — 6370000000 HC RX 637 (ALT 250 FOR IP): Performed by: NURSE PRACTITIONER

## 2024-09-10 PROCEDURE — 99223 1ST HOSP IP/OBS HIGH 75: CPT | Performed by: NURSE PRACTITIONER

## 2024-09-10 PROCEDURE — 99285 EMERGENCY DEPT VISIT HI MDM: CPT

## 2024-09-10 PROCEDURE — 82607 VITAMIN B-12: CPT

## 2024-09-10 PROCEDURE — 93005 ELECTROCARDIOGRAM TRACING: CPT

## 2024-09-10 PROCEDURE — 1200000000 HC SEMI PRIVATE

## 2024-09-10 PROCEDURE — 82805 BLOOD GASES W/O2 SATURATION: CPT

## 2024-09-10 PROCEDURE — 85025 COMPLETE CBC W/AUTO DIFF WBC: CPT

## 2024-09-10 PROCEDURE — 2700000000 HC OXYGEN THERAPY PER DAY

## 2024-09-10 PROCEDURE — 36415 COLL VENOUS BLD VENIPUNCTURE: CPT

## 2024-09-10 PROCEDURE — 6360000002 HC RX W HCPCS: Performed by: NURSE PRACTITIONER

## 2024-09-10 PROCEDURE — 6370000000 HC RX 637 (ALT 250 FOR IP): Performed by: STUDENT IN AN ORGANIZED HEALTH CARE EDUCATION/TRAINING PROGRAM

## 2024-09-10 PROCEDURE — 83880 ASSAY OF NATRIURETIC PEPTIDE: CPT

## 2024-09-10 PROCEDURE — 80048 BASIC METABOLIC PNL TOTAL CA: CPT

## 2024-09-10 PROCEDURE — 2500000003 HC RX 250 WO HCPCS

## 2024-09-10 PROCEDURE — 94640 AIRWAY INHALATION TREATMENT: CPT

## 2024-09-10 PROCEDURE — 82040 ASSAY OF SERUM ALBUMIN: CPT

## 2024-09-10 PROCEDURE — 73590 X-RAY EXAM OF LOWER LEG: CPT

## 2024-09-10 RX ORDER — ONDANSETRON 2 MG/ML
4 INJECTION INTRAMUSCULAR; INTRAVENOUS EVERY 6 HOURS PRN
Status: DISCONTINUED | OUTPATIENT
Start: 2024-09-10 | End: 2024-09-20 | Stop reason: HOSPADM

## 2024-09-10 RX ORDER — HYDRALAZINE HYDROCHLORIDE 50 MG/1
50 TABLET, FILM COATED ORAL EVERY 6 HOURS SCHEDULED
Status: DISCONTINUED | OUTPATIENT
Start: 2024-09-11 | End: 2024-09-10

## 2024-09-10 RX ORDER — CARVEDILOL 12.5 MG/1
25 TABLET ORAL ONCE
Status: COMPLETED | OUTPATIENT
Start: 2024-09-10 | End: 2024-09-10

## 2024-09-10 RX ORDER — CARVEDILOL 12.5 MG/1
12.5 TABLET ORAL 2 TIMES DAILY WITH MEALS
Status: DISCONTINUED | OUTPATIENT
Start: 2024-09-11 | End: 2024-09-20 | Stop reason: HOSPADM

## 2024-09-10 RX ORDER — POLYETHYLENE GLYCOL 3350 17 G/17G
17 POWDER, FOR SOLUTION ORAL DAILY
Status: DISCONTINUED | OUTPATIENT
Start: 2024-09-10 | End: 2024-09-20 | Stop reason: HOSPADM

## 2024-09-10 RX ORDER — INSULIN LISPRO 100 [IU]/ML
0-8 INJECTION, SOLUTION INTRAVENOUS; SUBCUTANEOUS
Status: DISCONTINUED | OUTPATIENT
Start: 2024-09-11 | End: 2024-09-20 | Stop reason: HOSPADM

## 2024-09-10 RX ORDER — SODIUM CHLORIDE 9 MG/ML
INJECTION, SOLUTION INTRAVENOUS PRN
Status: DISCONTINUED | OUTPATIENT
Start: 2024-09-10 | End: 2024-09-20 | Stop reason: HOSPADM

## 2024-09-10 RX ORDER — DEXTROSE MONOHYDRATE 100 MG/ML
INJECTION, SOLUTION INTRAVENOUS CONTINUOUS PRN
Status: DISCONTINUED | OUTPATIENT
Start: 2024-09-10 | End: 2024-09-20 | Stop reason: HOSPADM

## 2024-09-10 RX ORDER — SODIUM CHLORIDE 0.9 % (FLUSH) 0.9 %
5-40 SYRINGE (ML) INJECTION EVERY 12 HOURS SCHEDULED
Status: DISCONTINUED | OUTPATIENT
Start: 2024-09-10 | End: 2024-09-20 | Stop reason: HOSPADM

## 2024-09-10 RX ORDER — GINSENG 100 MG
CAPSULE ORAL 3 TIMES DAILY
Status: DISCONTINUED | OUTPATIENT
Start: 2024-09-10 | End: 2024-09-11

## 2024-09-10 RX ORDER — IPRATROPIUM BROMIDE AND ALBUTEROL SULFATE 2.5; .5 MG/3ML; MG/3ML
1 SOLUTION RESPIRATORY (INHALATION)
Status: DISCONTINUED | OUTPATIENT
Start: 2024-09-10 | End: 2024-09-10

## 2024-09-10 RX ORDER — BUMETANIDE 0.25 MG/ML
2 INJECTION INTRAMUSCULAR; INTRAVENOUS ONCE
Status: COMPLETED | OUTPATIENT
Start: 2024-09-10 | End: 2024-09-10

## 2024-09-10 RX ORDER — INSULIN LISPRO 100 [IU]/ML
0-4 INJECTION, SOLUTION INTRAVENOUS; SUBCUTANEOUS NIGHTLY
Status: DISCONTINUED | OUTPATIENT
Start: 2024-09-10 | End: 2024-09-20 | Stop reason: HOSPADM

## 2024-09-10 RX ORDER — SODIUM CHLORIDE 0.9 % (FLUSH) 0.9 %
5-40 SYRINGE (ML) INJECTION PRN
Status: DISCONTINUED | OUTPATIENT
Start: 2024-09-10 | End: 2024-09-20 | Stop reason: HOSPADM

## 2024-09-10 RX ORDER — PENTOXIFYLLINE 400 MG/1
400 TABLET, EXTENDED RELEASE ORAL
Status: DISCONTINUED | OUTPATIENT
Start: 2024-09-11 | End: 2024-09-20 | Stop reason: HOSPADM

## 2024-09-10 RX ORDER — MAGNESIUM SULFATE IN WATER 40 MG/ML
2000 INJECTION, SOLUTION INTRAVENOUS PRN
Status: DISCONTINUED | OUTPATIENT
Start: 2024-09-10 | End: 2024-09-10

## 2024-09-10 RX ORDER — MORPHINE SULFATE 4 MG/ML
2 INJECTION INTRAVENOUS EVERY 4 HOURS PRN
Status: DISCONTINUED | OUTPATIENT
Start: 2024-09-10 | End: 2024-09-14

## 2024-09-10 RX ORDER — POTASSIUM CHLORIDE 29.8 MG/ML
20 INJECTION INTRAVENOUS PRN
Status: DISCONTINUED | OUTPATIENT
Start: 2024-09-10 | End: 2024-09-10

## 2024-09-10 RX ORDER — SILVER SULFADIAZINE 10 MG/G
CREAM TOPICAL DAILY
Status: DISCONTINUED | OUTPATIENT
Start: 2024-09-10 | End: 2024-09-10 | Stop reason: SDUPTHER

## 2024-09-10 RX ORDER — BUDESONIDE AND FORMOTEROL FUMARATE DIHYDRATE 160; 4.5 UG/1; UG/1
2 AEROSOL RESPIRATORY (INHALATION)
Status: DISCONTINUED | OUTPATIENT
Start: 2024-09-10 | End: 2024-09-20 | Stop reason: HOSPADM

## 2024-09-10 RX ORDER — ATORVASTATIN CALCIUM 40 MG/1
40 TABLET, FILM COATED ORAL DAILY
Status: DISCONTINUED | OUTPATIENT
Start: 2024-09-10 | End: 2024-09-20 | Stop reason: HOSPADM

## 2024-09-10 RX ORDER — GINSENG 100 MG
CAPSULE ORAL ONCE
Status: COMPLETED | OUTPATIENT
Start: 2024-09-10 | End: 2024-09-10

## 2024-09-10 RX ORDER — FERROUS SULFATE 325(65) MG
325 TABLET, DELAYED RELEASE (ENTERIC COATED) ORAL
Status: DISCONTINUED | OUTPATIENT
Start: 2024-09-11 | End: 2024-09-20 | Stop reason: HOSPADM

## 2024-09-10 RX ORDER — IPRATROPIUM BROMIDE AND ALBUTEROL SULFATE 2.5; .5 MG/3ML; MG/3ML
1 SOLUTION RESPIRATORY (INHALATION)
Status: DISCONTINUED | OUTPATIENT
Start: 2024-09-10 | End: 2024-09-11

## 2024-09-10 RX ORDER — SILVER SULFADIAZINE 10 MG/G
CREAM TOPICAL 2 TIMES DAILY
Status: DISCONTINUED | OUTPATIENT
Start: 2024-09-10 | End: 2024-09-20 | Stop reason: HOSPADM

## 2024-09-10 RX ORDER — GLUCAGON 1 MG/ML
1 KIT INJECTION PRN
Status: DISCONTINUED | OUTPATIENT
Start: 2024-09-10 | End: 2024-09-20 | Stop reason: HOSPADM

## 2024-09-10 RX ORDER — HYDRALAZINE HYDROCHLORIDE 50 MG/1
50 TABLET, FILM COATED ORAL EVERY 8 HOURS SCHEDULED
Status: DISCONTINUED | OUTPATIENT
Start: 2024-09-10 | End: 2024-09-20 | Stop reason: HOSPADM

## 2024-09-10 RX ORDER — ONDANSETRON 4 MG/1
4 TABLET, ORALLY DISINTEGRATING ORAL EVERY 8 HOURS PRN
Status: DISCONTINUED | OUTPATIENT
Start: 2024-09-10 | End: 2024-09-20 | Stop reason: HOSPADM

## 2024-09-10 RX ORDER — POTASSIUM CHLORIDE 7.45 MG/ML
10 INJECTION INTRAVENOUS PRN
Status: DISCONTINUED | OUTPATIENT
Start: 2024-09-10 | End: 2024-09-10

## 2024-09-10 RX ORDER — HYDRALAZINE HYDROCHLORIDE 50 MG/1
100 TABLET, FILM COATED ORAL ONCE
Status: COMPLETED | OUTPATIENT
Start: 2024-09-10 | End: 2024-09-10

## 2024-09-10 RX ADMIN — SILVER SULFADIAZINE: 10 CREAM TOPICAL at 18:48

## 2024-09-10 RX ADMIN — POLYETHYLENE GLYCOL 3350 17 G: 17 POWDER, FOR SOLUTION ORAL at 22:47

## 2024-09-10 RX ADMIN — CARVEDILOL 25 MG: 12.5 TABLET, FILM COATED ORAL at 21:55

## 2024-09-10 RX ADMIN — BACITRACIN: 500 OINTMENT TOPICAL at 18:44

## 2024-09-10 RX ADMIN — APIXABAN 5 MG: 5 TABLET, FILM COATED ORAL at 23:17

## 2024-09-10 RX ADMIN — ATORVASTATIN CALCIUM 40 MG: 80 TABLET, FILM COATED ORAL at 23:18

## 2024-09-10 RX ADMIN — SODIUM CHLORIDE, PRESERVATIVE FREE 10 ML: 5 INJECTION INTRAVENOUS at 23:29

## 2024-09-10 RX ADMIN — FLUORESCEIN SODIUM 1 MG: 1 STRIP OPHTHALMIC at 18:49

## 2024-09-10 RX ADMIN — BUMETANIDE 2 MG: 0.25 INJECTION INTRAMUSCULAR; INTRAVENOUS at 23:19

## 2024-09-10 RX ADMIN — IPRATROPIUM BROMIDE AND ALBUTEROL SULFATE 1 DOSE: 2.5; .5 SOLUTION RESPIRATORY (INHALATION) at 23:11

## 2024-09-10 RX ADMIN — IPRATROPIUM BROMIDE AND ALBUTEROL SULFATE 1 DOSE: .5; 2.5 SOLUTION RESPIRATORY (INHALATION) at 19:41

## 2024-09-10 RX ADMIN — HYDRALAZINE HYDROCHLORIDE 100 MG: 50 TABLET ORAL at 21:55

## 2024-09-10 ASSESSMENT — PAIN SCALES - GENERAL
PAINLEVEL_OUTOF10: 0
PAINLEVEL_OUTOF10: 7

## 2024-09-10 ASSESSMENT — PAIN - FUNCTIONAL ASSESSMENT: PAIN_FUNCTIONAL_ASSESSMENT: 0-10

## 2024-09-11 ENCOUNTER — APPOINTMENT (OUTPATIENT)
Dept: GENERAL RADIOLOGY | Age: 74
End: 2024-09-11

## 2024-09-11 PROBLEM — T14.90XA INHALATION INJURY: Status: ACTIVE | Noted: 2024-09-11

## 2024-09-11 LAB
25(OH)D3 SERPL-MCNC: 18.5 NG/ML (ref 30–100)
ALLEN TEST: POSITIVE
ANION GAP SERPL CALCULATED.3IONS-SCNC: 14 MMOL/L (ref 9–16)
BASOPHILS # BLD: 0.06 K/UL (ref 0–0.2)
BASOPHILS NFR BLD: 1 % (ref 0–2)
BNP SERPL-MCNC: ABNORMAL PG/ML (ref 0–300)
BUN SERPL-MCNC: 45 MG/DL (ref 8–23)
CALCIUM SERPL-MCNC: 8.5 MG/DL (ref 8.6–10.4)
CHLORIDE SERPL-SCNC: 99 MMOL/L (ref 98–107)
CHOLEST SERPL-MCNC: 128 MG/DL (ref 0–199)
CHOLESTEROL/HDL RATIO: 4
CO2 SERPL-SCNC: 18 MMOL/L (ref 20–31)
CREAT SERPL-MCNC: 2.2 MG/DL (ref 0.5–0.9)
EKG ATRIAL RATE: 85 BPM
EKG Q-T INTERVAL: 430 MS
EKG QRS DURATION: 142 MS
EKG QTC CALCULATION (BAZETT): 464 MS
EKG R AXIS: -65 DEGREES
EKG T AXIS: 115 DEGREES
EKG VENTRICULAR RATE: 70 BPM
EOSINOPHIL # BLD: 0.32 K/UL (ref 0–0.44)
EOSINOPHILS RELATIVE PERCENT: 4 % (ref 1–4)
ERYTHROCYTE [DISTWIDTH] IN BLOOD BY AUTOMATED COUNT: 15.9 % (ref 11.8–14.4)
FIO2: 32
GFR, ESTIMATED: 24 ML/MIN/1.73M2
GLUCOSE BLD-MCNC: 100 MG/DL (ref 65–105)
GLUCOSE BLD-MCNC: 103 MG/DL (ref 65–105)
GLUCOSE BLD-MCNC: 115 MG/DL (ref 65–105)
GLUCOSE BLD-MCNC: 91 MG/DL (ref 65–105)
GLUCOSE SERPL-MCNC: 89 MG/DL (ref 74–99)
HCT VFR BLD AUTO: 36.5 % (ref 36.3–47.1)
HDLC SERPL-MCNC: 36 MG/DL
HGB BLD-MCNC: 11.1 G/DL (ref 11.9–15.1)
IMM GRANULOCYTES # BLD AUTO: 0.03 K/UL (ref 0–0.3)
IMM GRANULOCYTES NFR BLD: 0 %
INR PPP: 1.2
LDLC SERPL CALC-MCNC: 77 MG/DL (ref 0–100)
LYMPHOCYTES NFR BLD: 0.75 K/UL (ref 1.1–3.7)
LYMPHOCYTES RELATIVE PERCENT: 10 % (ref 24–43)
MAGNESIUM SERPL-MCNC: 2.6 MG/DL (ref 1.6–2.4)
MCH RBC QN AUTO: 28.4 PG (ref 25.2–33.5)
MCHC RBC AUTO-ENTMCNC: 30.4 G/DL (ref 28.4–34.8)
MCV RBC AUTO: 93.4 FL (ref 82.6–102.9)
MONOCYTES NFR BLD: 0.97 K/UL (ref 0.1–1.2)
MONOCYTES NFR BLD: 13 % (ref 3–12)
NEUTROPHILS NFR BLD: 72 % (ref 36–65)
NEUTS SEG NFR BLD: 5.54 K/UL (ref 1.5–8.1)
NRBC BLD-RTO: 0 PER 100 WBC
O2 DELIVERY DEVICE: ABNORMAL
PLATELET # BLD AUTO: 233 K/UL (ref 138–453)
PMV BLD AUTO: 10.8 FL (ref 8.1–13.5)
POC HCO3: 29 MMOL/L (ref 21–28)
POC O2 SATURATION: 97.5 % (ref 94–98)
POC PCO2: 42.4 MM HG (ref 35–48)
POC PH: 7.44 (ref 7.35–7.45)
POC PO2: 92.8 MM HG (ref 83–108)
POSITIVE BASE EXCESS, ART: 4.4 MMOL/L (ref 0–3)
POTASSIUM SERPL-SCNC: 3.6 MMOL/L (ref 3.7–5.3)
PROTHROMBIN TIME: 14.7 SEC (ref 11.7–14.9)
RBC # BLD AUTO: 3.91 M/UL (ref 3.95–5.11)
RBC # BLD: ABNORMAL 10*6/UL
SAMPLE SITE: ABNORMAL
SODIUM SERPL-SCNC: 131 MMOL/L (ref 136–145)
TRIGL SERPL-MCNC: 71 MG/DL
TROPONIN I SERPL HS-MCNC: 32 NG/L (ref 0–14)
TROPONIN I SERPL HS-MCNC: 38 NG/L (ref 0–14)
VLDLC SERPL CALC-MCNC: 14 MG/DL
WBC OTHER # BLD: 7.7 K/UL (ref 3.5–11.3)

## 2024-09-11 PROCEDURE — 97535 SELF CARE MNGMENT TRAINING: CPT

## 2024-09-11 PROCEDURE — 6370000000 HC RX 637 (ALT 250 FOR IP): Performed by: INTERNAL MEDICINE

## 2024-09-11 PROCEDURE — 51798 US URINE CAPACITY MEASURE: CPT

## 2024-09-11 PROCEDURE — 6370000000 HC RX 637 (ALT 250 FOR IP): Performed by: NURSE PRACTITIONER

## 2024-09-11 PROCEDURE — 6360000002 HC RX W HCPCS: Performed by: NURSE PRACTITIONER

## 2024-09-11 PROCEDURE — 97162 PT EVAL MOD COMPLEX 30 MIN: CPT

## 2024-09-11 PROCEDURE — 83735 ASSAY OF MAGNESIUM: CPT

## 2024-09-11 PROCEDURE — 97530 THERAPEUTIC ACTIVITIES: CPT

## 2024-09-11 PROCEDURE — 82306 VITAMIN D 25 HYDROXY: CPT

## 2024-09-11 PROCEDURE — 80061 LIPID PANEL: CPT

## 2024-09-11 PROCEDURE — 82803 BLOOD GASES ANY COMBINATION: CPT

## 2024-09-11 PROCEDURE — 82947 ASSAY GLUCOSE BLOOD QUANT: CPT

## 2024-09-11 PROCEDURE — 94761 N-INVAS EAR/PLS OXIMETRY MLT: CPT

## 2024-09-11 PROCEDURE — 2580000003 HC RX 258: Performed by: NURSE PRACTITIONER

## 2024-09-11 PROCEDURE — 80048 BASIC METABOLIC PNL TOTAL CA: CPT

## 2024-09-11 PROCEDURE — 83880 ASSAY OF NATRIURETIC PEPTIDE: CPT

## 2024-09-11 PROCEDURE — 36415 COLL VENOUS BLD VENIPUNCTURE: CPT

## 2024-09-11 PROCEDURE — 71046 X-RAY EXAM CHEST 2 VIEWS: CPT

## 2024-09-11 PROCEDURE — 85025 COMPLETE CBC W/AUTO DIFF WBC: CPT

## 2024-09-11 PROCEDURE — 2500000003 HC RX 250 WO HCPCS: Performed by: STUDENT IN AN ORGANIZED HEALTH CARE EDUCATION/TRAINING PROGRAM

## 2024-09-11 PROCEDURE — 99222 1ST HOSP IP/OBS MODERATE 55: CPT | Performed by: INTERNAL MEDICINE

## 2024-09-11 PROCEDURE — 2580000003 HC RX 258: Performed by: STUDENT IN AN ORGANIZED HEALTH CARE EDUCATION/TRAINING PROGRAM

## 2024-09-11 PROCEDURE — 97166 OT EVAL MOD COMPLEX 45 MIN: CPT

## 2024-09-11 PROCEDURE — 84484 ASSAY OF TROPONIN QUANT: CPT

## 2024-09-11 PROCEDURE — 99232 SBSQ HOSP IP/OBS MODERATE 35: CPT | Performed by: INTERNAL MEDICINE

## 2024-09-11 PROCEDURE — 94640 AIRWAY INHALATION TREATMENT: CPT

## 2024-09-11 PROCEDURE — 2700000000 HC OXYGEN THERAPY PER DAY

## 2024-09-11 PROCEDURE — 36600 WITHDRAWAL OF ARTERIAL BLOOD: CPT

## 2024-09-11 PROCEDURE — 85610 PROTHROMBIN TIME: CPT

## 2024-09-11 PROCEDURE — 1200000000 HC SEMI PRIVATE

## 2024-09-11 PROCEDURE — 6370000000 HC RX 637 (ALT 250 FOR IP): Performed by: STUDENT IN AN ORGANIZED HEALTH CARE EDUCATION/TRAINING PROGRAM

## 2024-09-11 RX ORDER — SODIUM CHLORIDE 0.9 % (FLUSH) 0.9 %
10 SYRINGE (ML) INJECTION PRN
Status: DISCONTINUED | OUTPATIENT
Start: 2024-09-11 | End: 2024-09-20 | Stop reason: HOSPADM

## 2024-09-11 RX ORDER — ONDANSETRON 2 MG/ML
4 INJECTION INTRAMUSCULAR; INTRAVENOUS EVERY 6 HOURS PRN
Status: DISCONTINUED | OUTPATIENT
Start: 2024-09-11 | End: 2024-09-13 | Stop reason: SDUPTHER

## 2024-09-11 RX ORDER — GINSENG 100 MG
CAPSULE ORAL 2 TIMES DAILY
Status: DISCONTINUED | OUTPATIENT
Start: 2024-09-11 | End: 2024-09-20 | Stop reason: HOSPADM

## 2024-09-11 RX ORDER — GUAIFENESIN 600 MG/1
600 TABLET, EXTENDED RELEASE ORAL 2 TIMES DAILY
Qty: 20 TABLET | Refills: 0 | Status: SHIPPED | OUTPATIENT
Start: 2024-09-11 | End: 2024-09-21

## 2024-09-11 RX ORDER — SILVER SULFADIAZINE 10 MG/G
CREAM TOPICAL
Qty: 25 G | Refills: 1 | Status: SHIPPED | OUTPATIENT
Start: 2024-09-11

## 2024-09-11 RX ORDER — GUAIFENESIN 600 MG/1
600 TABLET, EXTENDED RELEASE ORAL 2 TIMES DAILY
Status: DISCONTINUED | OUTPATIENT
Start: 2024-09-11 | End: 2024-09-20 | Stop reason: HOSPADM

## 2024-09-11 RX ORDER — ACETAMINOPHEN 325 MG/1
650 TABLET ORAL EVERY 6 HOURS PRN
Status: DISCONTINUED | OUTPATIENT
Start: 2024-09-11 | End: 2024-09-20 | Stop reason: HOSPADM

## 2024-09-11 RX ORDER — BUMETANIDE 0.25 MG/ML
2 INJECTION INTRAMUSCULAR; INTRAVENOUS DAILY
Status: DISCONTINUED | OUTPATIENT
Start: 2024-09-11 | End: 2024-09-12

## 2024-09-11 RX ORDER — POLYETHYLENE GLYCOL 3350 17 G/17G
17 POWDER, FOR SOLUTION ORAL DAILY PRN
Status: DISCONTINUED | OUTPATIENT
Start: 2024-09-11 | End: 2024-09-20 | Stop reason: HOSPADM

## 2024-09-11 RX ORDER — FERROUS SULFATE 325(65) MG
325 TABLET, DELAYED RELEASE (ENTERIC COATED) ORAL
Qty: 90 TABLET | Refills: 0 | Status: SHIPPED | OUTPATIENT
Start: 2024-09-12

## 2024-09-11 RX ORDER — PROCHLORPERAZINE EDISYLATE 5 MG/ML
10 INJECTION INTRAMUSCULAR; INTRAVENOUS ONCE
Status: COMPLETED | OUTPATIENT
Start: 2024-09-11 | End: 2024-09-11

## 2024-09-11 RX ORDER — IPRATROPIUM BROMIDE AND ALBUTEROL SULFATE 2.5; .5 MG/3ML; MG/3ML
1 SOLUTION RESPIRATORY (INHALATION)
Status: DISCONTINUED | OUTPATIENT
Start: 2024-09-11 | End: 2024-09-20 | Stop reason: HOSPADM

## 2024-09-11 RX ORDER — ONDANSETRON 4 MG/1
4 TABLET, ORALLY DISINTEGRATING ORAL EVERY 8 HOURS PRN
Status: DISCONTINUED | OUTPATIENT
Start: 2024-09-11 | End: 2024-09-13 | Stop reason: SDUPTHER

## 2024-09-11 RX ORDER — GINSENG 100 MG
CAPSULE ORAL
Qty: 14.2 G | Refills: 1 | Status: SHIPPED | OUTPATIENT
Start: 2024-09-11 | End: 2024-09-21

## 2024-09-11 RX ORDER — SODIUM CHLORIDE 0.9 % (FLUSH) 0.9 %
5-40 SYRINGE (ML) INJECTION EVERY 12 HOURS SCHEDULED
Status: DISCONTINUED | OUTPATIENT
Start: 2024-09-11 | End: 2024-09-20 | Stop reason: HOSPADM

## 2024-09-11 RX ORDER — SODIUM CHLORIDE 9 MG/ML
INJECTION, SOLUTION INTRAVENOUS PRN
Status: DISCONTINUED | OUTPATIENT
Start: 2024-09-11 | End: 2024-09-20 | Stop reason: HOSPADM

## 2024-09-11 RX ORDER — ACETAMINOPHEN 650 MG/1
650 SUPPOSITORY RECTAL EVERY 6 HOURS PRN
Status: DISCONTINUED | OUTPATIENT
Start: 2024-09-11 | End: 2024-09-20 | Stop reason: HOSPADM

## 2024-09-11 RX ORDER — BUDESONIDE AND FORMOTEROL FUMARATE DIHYDRATE 160; 4.5 UG/1; UG/1
2 AEROSOL RESPIRATORY (INHALATION)
Qty: 10.2 G | Refills: 0 | Status: SHIPPED | OUTPATIENT
Start: 2024-09-11

## 2024-09-11 RX ORDER — ALBUTEROL SULFATE 0.83 MG/ML
2.5 SOLUTION RESPIRATORY (INHALATION) EVERY 6 HOURS PRN
Status: DISCONTINUED | OUTPATIENT
Start: 2024-09-11 | End: 2024-09-20 | Stop reason: HOSPADM

## 2024-09-11 RX ORDER — HYDRALAZINE HYDROCHLORIDE 50 MG/1
50 TABLET, FILM COATED ORAL EVERY 8 HOURS SCHEDULED
Qty: 90 TABLET | Refills: 0 | Status: SHIPPED | OUTPATIENT
Start: 2024-09-11

## 2024-09-11 RX ADMIN — PENTOXIFYLLINE 400 MG: 400 TABLET, EXTENDED RELEASE ORAL at 17:12

## 2024-09-11 RX ADMIN — BUDESONIDE AND FORMOTEROL FUMARATE DIHYDRATE 2 PUFF: 160; 4.5 AEROSOL RESPIRATORY (INHALATION) at 07:31

## 2024-09-11 RX ADMIN — MORPHINE SULFATE 2 MG: 4 INJECTION INTRAVENOUS at 19:28

## 2024-09-11 RX ADMIN — GUAIFENESIN 600 MG: 600 TABLET, EXTENDED RELEASE ORAL at 21:30

## 2024-09-11 RX ADMIN — BACITRACIN: 500 OINTMENT TOPICAL at 09:08

## 2024-09-11 RX ADMIN — GUAIFENESIN 600 MG: 600 TABLET, EXTENDED RELEASE ORAL at 11:48

## 2024-09-11 RX ADMIN — ATORVASTATIN CALCIUM 40 MG: 80 TABLET, FILM COATED ORAL at 21:30

## 2024-09-11 RX ADMIN — SODIUM CHLORIDE, PRESERVATIVE FREE 10 ML: 5 INJECTION INTRAVENOUS at 21:30

## 2024-09-11 RX ADMIN — ACETAMINOPHEN 650 MG: 325 TABLET ORAL at 11:48

## 2024-09-11 RX ADMIN — BUMETANIDE 2 MG: 0.25 INJECTION INTRAMUSCULAR; INTRAVENOUS at 09:09

## 2024-09-11 RX ADMIN — MORPHINE SULFATE 2 MG: 4 INJECTION INTRAVENOUS at 12:41

## 2024-09-11 RX ADMIN — PENTOXIFYLLINE 400 MG: 400 TABLET, EXTENDED RELEASE ORAL at 09:08

## 2024-09-11 RX ADMIN — PROCHLORPERAZINE EDISYLATE 10 MG: 5 INJECTION INTRAMUSCULAR; INTRAVENOUS at 21:32

## 2024-09-11 RX ADMIN — CARVEDILOL 12.5 MG: 12.5 TABLET, FILM COATED ORAL at 09:07

## 2024-09-11 RX ADMIN — FERROUS SULFATE TAB EC 325 MG (65 MG FE EQUIVALENT) 325 MG: 325 (65 FE) TABLET DELAYED RESPONSE at 09:06

## 2024-09-11 RX ADMIN — HYDRALAZINE HYDROCHLORIDE 50 MG: 50 TABLET ORAL at 22:04

## 2024-09-11 RX ADMIN — POTASSIUM BICARBONATE 40 MEQ: 782 TABLET, EFFERVESCENT ORAL at 17:12

## 2024-09-11 RX ADMIN — APIXABAN 5 MG: 5 TABLET, FILM COATED ORAL at 09:06

## 2024-09-11 RX ADMIN — EMPAGLIFLOZIN 10 MG: 10 TABLET, FILM COATED ORAL at 09:06

## 2024-09-11 RX ADMIN — SILVER SULFADIAZINE: 10 CREAM TOPICAL at 09:10

## 2024-09-11 RX ADMIN — PENTOXIFYLLINE 400 MG: 400 TABLET, EXTENDED RELEASE ORAL at 11:49

## 2024-09-11 RX ADMIN — BACITRACIN: 500 OINTMENT TOPICAL at 21:31

## 2024-09-11 RX ADMIN — IPRATROPIUM BROMIDE AND ALBUTEROL SULFATE 1 DOSE: 2.5; .5 SOLUTION RESPIRATORY (INHALATION) at 07:31

## 2024-09-11 RX ADMIN — ONDANSETRON 4 MG: 2 INJECTION INTRAMUSCULAR; INTRAVENOUS at 17:18

## 2024-09-11 RX ADMIN — SODIUM CHLORIDE, PRESERVATIVE FREE 10 ML: 5 INJECTION INTRAVENOUS at 09:09

## 2024-09-11 RX ADMIN — SILVER SULFADIAZINE: 10 CREAM TOPICAL at 21:31

## 2024-09-11 RX ADMIN — APIXABAN 5 MG: 5 TABLET, FILM COATED ORAL at 21:30

## 2024-09-11 RX ADMIN — HYDRALAZINE HYDROCHLORIDE 50 MG: 50 TABLET ORAL at 15:05

## 2024-09-11 ASSESSMENT — PAIN SCALES - GENERAL
PAINLEVEL_OUTOF10: 8
PAINLEVEL_OUTOF10: 3
PAINLEVEL_OUTOF10: 3
PAINLEVEL_OUTOF10: 5
PAINLEVEL_OUTOF10: 0
PAINLEVEL_OUTOF10: 10

## 2024-09-11 ASSESSMENT — PAIN - FUNCTIONAL ASSESSMENT
PAIN_FUNCTIONAL_ASSESSMENT: ACTIVITIES ARE NOT PREVENTED

## 2024-09-11 ASSESSMENT — PAIN DESCRIPTION - DESCRIPTORS
DESCRIPTORS: ACHING;DISCOMFORT
DESCRIPTORS: ACHING;DISCOMFORT
DESCRIPTORS: ACHING;DISCOMFORT;CRAMPING

## 2024-09-11 ASSESSMENT — PAIN DESCRIPTION - LOCATION
LOCATION: GENERALIZED
LOCATION: LEG
LOCATION: LEG

## 2024-09-11 ASSESSMENT — PAIN DESCRIPTION - ORIENTATION
ORIENTATION: LEFT
ORIENTATION: LEFT

## 2024-09-12 LAB
ANION GAP SERPL CALCULATED.3IONS-SCNC: 12 MMOL/L (ref 9–16)
BASOPHILS # BLD: 0.08 K/UL (ref 0–0.2)
BASOPHILS NFR BLD: 1 % (ref 0–2)
BUN SERPL-MCNC: 41 MG/DL (ref 8–23)
CALCIUM SERPL-MCNC: 8.4 MG/DL (ref 8.6–10.4)
CHLORIDE SERPL-SCNC: 98 MMOL/L (ref 98–107)
CO2 SERPL-SCNC: 23 MMOL/L (ref 20–31)
CREAT SERPL-MCNC: 2 MG/DL (ref 0.5–0.9)
EOSINOPHIL # BLD: 0.35 K/UL (ref 0–0.44)
EOSINOPHILS RELATIVE PERCENT: 3 % (ref 1–4)
ERYTHROCYTE [DISTWIDTH] IN BLOOD BY AUTOMATED COUNT: 16 % (ref 11.8–14.4)
GFR, ESTIMATED: 26 ML/MIN/1.73M2
GLUCOSE BLD-MCNC: 105 MG/DL (ref 65–105)
GLUCOSE BLD-MCNC: 133 MG/DL (ref 65–105)
GLUCOSE BLD-MCNC: 143 MG/DL (ref 65–105)
GLUCOSE SERPL-MCNC: 103 MG/DL (ref 74–99)
HCT VFR BLD AUTO: 39.5 % (ref 36.3–47.1)
HGB BLD-MCNC: 12.2 G/DL (ref 11.9–15.1)
IMM GRANULOCYTES # BLD AUTO: 0.05 K/UL (ref 0–0.3)
IMM GRANULOCYTES NFR BLD: 1 %
LYMPHOCYTES NFR BLD: 1.02 K/UL (ref 1.1–3.7)
LYMPHOCYTES RELATIVE PERCENT: 9 % (ref 24–43)
MAGNESIUM SERPL-MCNC: 2.3 MG/DL (ref 1.6–2.4)
MCH RBC QN AUTO: 28.6 PG (ref 25.2–33.5)
MCHC RBC AUTO-ENTMCNC: 30.9 G/DL (ref 28.4–34.8)
MCV RBC AUTO: 92.7 FL (ref 82.6–102.9)
MONOCYTES NFR BLD: 1.35 K/UL (ref 0.1–1.2)
MONOCYTES NFR BLD: 12 % (ref 3–12)
NEUTROPHILS NFR BLD: 74 % (ref 36–65)
NEUTS SEG NFR BLD: 8.2 K/UL (ref 1.5–8.1)
NRBC BLD-RTO: 0 PER 100 WBC
PLATELET # BLD AUTO: 259 K/UL (ref 138–453)
PMV BLD AUTO: 10.8 FL (ref 8.1–13.5)
POTASSIUM SERPL-SCNC: 4.2 MMOL/L (ref 3.7–5.3)
RBC # BLD AUTO: 4.26 M/UL (ref 3.95–5.11)
RBC # BLD: ABNORMAL 10*6/UL
SODIUM SERPL-SCNC: 133 MMOL/L (ref 136–145)
WBC OTHER # BLD: 11.1 K/UL (ref 3.5–11.3)

## 2024-09-12 PROCEDURE — 6370000000 HC RX 637 (ALT 250 FOR IP): Performed by: NURSE PRACTITIONER

## 2024-09-12 PROCEDURE — 99232 SBSQ HOSP IP/OBS MODERATE 35: CPT | Performed by: NURSE PRACTITIONER

## 2024-09-12 PROCEDURE — 6360000002 HC RX W HCPCS: Performed by: NURSE PRACTITIONER

## 2024-09-12 PROCEDURE — 1200000000 HC SEMI PRIVATE

## 2024-09-12 PROCEDURE — 6370000000 HC RX 637 (ALT 250 FOR IP): Performed by: INTERNAL MEDICINE

## 2024-09-12 PROCEDURE — 82947 ASSAY GLUCOSE BLOOD QUANT: CPT

## 2024-09-12 PROCEDURE — 2580000003 HC RX 258: Performed by: NURSE PRACTITIONER

## 2024-09-12 PROCEDURE — 80048 BASIC METABOLIC PNL TOTAL CA: CPT

## 2024-09-12 PROCEDURE — 2700000000 HC OXYGEN THERAPY PER DAY

## 2024-09-12 PROCEDURE — 6370000000 HC RX 637 (ALT 250 FOR IP): Performed by: STUDENT IN AN ORGANIZED HEALTH CARE EDUCATION/TRAINING PROGRAM

## 2024-09-12 PROCEDURE — 94761 N-INVAS EAR/PLS OXIMETRY MLT: CPT

## 2024-09-12 PROCEDURE — 94640 AIRWAY INHALATION TREATMENT: CPT

## 2024-09-12 PROCEDURE — 83735 ASSAY OF MAGNESIUM: CPT

## 2024-09-12 PROCEDURE — 85025 COMPLETE CBC W/AUTO DIFF WBC: CPT

## 2024-09-12 PROCEDURE — 2500000003 HC RX 250 WO HCPCS: Performed by: STUDENT IN AN ORGANIZED HEALTH CARE EDUCATION/TRAINING PROGRAM

## 2024-09-12 PROCEDURE — 99233 SBSQ HOSP IP/OBS HIGH 50: CPT | Performed by: INTERNAL MEDICINE

## 2024-09-12 PROCEDURE — 36415 COLL VENOUS BLD VENIPUNCTURE: CPT

## 2024-09-12 RX ORDER — BUMETANIDE 1 MG/1
2 TABLET ORAL DAILY
Status: DISCONTINUED | OUTPATIENT
Start: 2024-09-12 | End: 2024-09-20 | Stop reason: HOSPADM

## 2024-09-12 RX ORDER — LANOLIN ALCOHOL/MO/W.PET/CERES
3 CREAM (GRAM) TOPICAL NIGHTLY PRN
Status: DISCONTINUED | OUTPATIENT
Start: 2024-09-12 | End: 2024-09-20 | Stop reason: HOSPADM

## 2024-09-12 RX ADMIN — SODIUM CHLORIDE, PRESERVATIVE FREE 10 ML: 5 INJECTION INTRAVENOUS at 08:25

## 2024-09-12 RX ADMIN — ATORVASTATIN CALCIUM 40 MG: 80 TABLET, FILM COATED ORAL at 21:47

## 2024-09-12 RX ADMIN — IPRATROPIUM BROMIDE AND ALBUTEROL SULFATE 1 DOSE: 2.5; .5 SOLUTION RESPIRATORY (INHALATION) at 21:45

## 2024-09-12 RX ADMIN — BACITRACIN: 500 OINTMENT TOPICAL at 21:48

## 2024-09-12 RX ADMIN — IPRATROPIUM BROMIDE AND ALBUTEROL SULFATE 1 DOSE: 2.5; .5 SOLUTION RESPIRATORY (INHALATION) at 08:11

## 2024-09-12 RX ADMIN — Medication 3 MG: at 21:47

## 2024-09-12 RX ADMIN — CARVEDILOL 12.5 MG: 12.5 TABLET, FILM COATED ORAL at 17:28

## 2024-09-12 RX ADMIN — GUAIFENESIN 600 MG: 600 TABLET, EXTENDED RELEASE ORAL at 21:47

## 2024-09-12 RX ADMIN — PENTOXIFYLLINE 400 MG: 400 TABLET, EXTENDED RELEASE ORAL at 13:00

## 2024-09-12 RX ADMIN — BACITRACIN: 500 OINTMENT TOPICAL at 08:34

## 2024-09-12 RX ADMIN — BUDESONIDE AND FORMOTEROL FUMARATE DIHYDRATE 2 PUFF: 160; 4.5 AEROSOL RESPIRATORY (INHALATION) at 08:11

## 2024-09-12 RX ADMIN — SILVER SULFADIAZINE: 10 CREAM TOPICAL at 08:35

## 2024-09-12 RX ADMIN — SILVER SULFADIAZINE: 10 CREAM TOPICAL at 21:48

## 2024-09-12 RX ADMIN — ONDANSETRON 4 MG: 2 INJECTION INTRAMUSCULAR; INTRAVENOUS at 05:46

## 2024-09-12 RX ADMIN — PENTOXIFYLLINE 400 MG: 400 TABLET, EXTENDED RELEASE ORAL at 17:28

## 2024-09-12 RX ADMIN — ACETAMINOPHEN 650 MG: 325 TABLET ORAL at 13:22

## 2024-09-12 RX ADMIN — CARVEDILOL 12.5 MG: 12.5 TABLET, FILM COATED ORAL at 08:25

## 2024-09-12 RX ADMIN — BUDESONIDE AND FORMOTEROL FUMARATE DIHYDRATE 2 PUFF: 160; 4.5 AEROSOL RESPIRATORY (INHALATION) at 21:48

## 2024-09-12 RX ADMIN — FERROUS SULFATE TAB EC 325 MG (65 MG FE EQUIVALENT) 325 MG: 325 (65 FE) TABLET DELAYED RESPONSE at 08:25

## 2024-09-12 RX ADMIN — APIXABAN 5 MG: 5 TABLET, FILM COATED ORAL at 08:25

## 2024-09-12 RX ADMIN — APIXABAN 5 MG: 5 TABLET, FILM COATED ORAL at 21:47

## 2024-09-12 RX ADMIN — GUAIFENESIN 600 MG: 600 TABLET, EXTENDED RELEASE ORAL at 08:25

## 2024-09-12 RX ADMIN — PENTOXIFYLLINE 400 MG: 400 TABLET, EXTENDED RELEASE ORAL at 08:25

## 2024-09-12 RX ADMIN — EMPAGLIFLOZIN 10 MG: 10 TABLET, FILM COATED ORAL at 08:25

## 2024-09-12 RX ADMIN — HYDRALAZINE HYDROCHLORIDE 50 MG: 50 TABLET ORAL at 05:42

## 2024-09-12 ASSESSMENT — PAIN DESCRIPTION - DESCRIPTORS: DESCRIPTORS: ACHING

## 2024-09-12 ASSESSMENT — PAIN DESCRIPTION - LOCATION: LOCATION: BREAST;CHEST

## 2024-09-12 ASSESSMENT — PAIN - FUNCTIONAL ASSESSMENT: PAIN_FUNCTIONAL_ASSESSMENT: PREVENTS OR INTERFERES SOME ACTIVE ACTIVITIES AND ADLS

## 2024-09-12 ASSESSMENT — PAIN SCALES - GENERAL
PAINLEVEL_OUTOF10: 4
PAINLEVEL_OUTOF10: 8

## 2024-09-13 LAB
ANION GAP SERPL CALCULATED.3IONS-SCNC: 12 MMOL/L (ref 9–16)
BUN SERPL-MCNC: 39 MG/DL (ref 8–23)
CALCIUM SERPL-MCNC: 9.6 MG/DL (ref 8.6–10.4)
CHLORIDE SERPL-SCNC: 95 MMOL/L (ref 98–107)
CO2 SERPL-SCNC: 22 MMOL/L (ref 20–31)
CREAT SERPL-MCNC: 1.9 MG/DL (ref 0.5–0.9)
ERYTHROCYTE [DISTWIDTH] IN BLOOD BY AUTOMATED COUNT: 15.9 % (ref 11.8–14.4)
GFR, ESTIMATED: 27 ML/MIN/1.73M2
GLUCOSE BLD-MCNC: 106 MG/DL (ref 65–105)
GLUCOSE BLD-MCNC: 108 MG/DL (ref 65–105)
GLUCOSE BLD-MCNC: 112 MG/DL (ref 65–105)
GLUCOSE SERPL-MCNC: 115 MG/DL (ref 74–99)
HCT VFR BLD AUTO: 35.1 % (ref 36.3–47.1)
HGB BLD-MCNC: 10.9 G/DL (ref 11.9–15.1)
MAGNESIUM SERPL-MCNC: 2.5 MG/DL (ref 1.6–2.4)
MCH RBC QN AUTO: 28.2 PG (ref 25.2–33.5)
MCHC RBC AUTO-ENTMCNC: 31.1 G/DL (ref 28.4–34.8)
MCV RBC AUTO: 90.7 FL (ref 82.6–102.9)
NRBC BLD-RTO: 0 PER 100 WBC
PLATELET # BLD AUTO: 272 K/UL (ref 138–453)
PMV BLD AUTO: 10.6 FL (ref 8.1–13.5)
POTASSIUM SERPL-SCNC: 4.4 MMOL/L (ref 3.7–5.3)
RBC # BLD AUTO: 3.87 M/UL (ref 3.95–5.11)
SODIUM SERPL-SCNC: 129 MMOL/L (ref 136–145)
WBC OTHER # BLD: 10.8 K/UL (ref 3.5–11.3)

## 2024-09-13 PROCEDURE — 6370000000 HC RX 637 (ALT 250 FOR IP): Performed by: NURSE PRACTITIONER

## 2024-09-13 PROCEDURE — 6370000000 HC RX 637 (ALT 250 FOR IP): Performed by: INTERNAL MEDICINE

## 2024-09-13 PROCEDURE — 6370000000 HC RX 637 (ALT 250 FOR IP): Performed by: STUDENT IN AN ORGANIZED HEALTH CARE EDUCATION/TRAINING PROGRAM

## 2024-09-13 PROCEDURE — 36415 COLL VENOUS BLD VENIPUNCTURE: CPT

## 2024-09-13 PROCEDURE — 85027 COMPLETE CBC AUTOMATED: CPT

## 2024-09-13 PROCEDURE — 99232 SBSQ HOSP IP/OBS MODERATE 35: CPT | Performed by: NURSE PRACTITIONER

## 2024-09-13 PROCEDURE — 82947 ASSAY GLUCOSE BLOOD QUANT: CPT

## 2024-09-13 PROCEDURE — 94640 AIRWAY INHALATION TREATMENT: CPT

## 2024-09-13 PROCEDURE — 97530 THERAPEUTIC ACTIVITIES: CPT

## 2024-09-13 PROCEDURE — 1200000000 HC SEMI PRIVATE

## 2024-09-13 PROCEDURE — 80048 BASIC METABOLIC PNL TOTAL CA: CPT

## 2024-09-13 PROCEDURE — 83735 ASSAY OF MAGNESIUM: CPT

## 2024-09-13 PROCEDURE — 99232 SBSQ HOSP IP/OBS MODERATE 35: CPT | Performed by: INTERNAL MEDICINE

## 2024-09-13 RX ORDER — HYDROCODONE BITARTRATE AND ACETAMINOPHEN 5; 325 MG/1; MG/1
2 TABLET ORAL EVERY 4 HOURS PRN
Status: DISCONTINUED | OUTPATIENT
Start: 2024-09-13 | End: 2024-09-20 | Stop reason: HOSPADM

## 2024-09-13 RX ORDER — HYDROCODONE BITARTRATE AND ACETAMINOPHEN 5; 325 MG/1; MG/1
1 TABLET ORAL EVERY 4 HOURS PRN
Status: DISCONTINUED | OUTPATIENT
Start: 2024-09-13 | End: 2024-09-20 | Stop reason: HOSPADM

## 2024-09-13 RX ORDER — SODIUM CHLORIDE 1 G/1
2 TABLET ORAL ONCE
Status: COMPLETED | OUTPATIENT
Start: 2024-09-13 | End: 2024-09-13

## 2024-09-13 RX ORDER — HYDROCODONE BITARTRATE AND ACETAMINOPHEN 5; 325 MG/1; MG/1
1 TABLET ORAL EVERY 6 HOURS PRN
Qty: 12 TABLET | Refills: 0 | Status: SHIPPED | OUTPATIENT
Start: 2024-09-13 | End: 2024-09-18

## 2024-09-13 RX ADMIN — IPRATROPIUM BROMIDE AND ALBUTEROL SULFATE 1 DOSE: 2.5; .5 SOLUTION RESPIRATORY (INHALATION) at 08:00

## 2024-09-13 RX ADMIN — CARVEDILOL 12.5 MG: 12.5 TABLET, FILM COATED ORAL at 11:39

## 2024-09-13 RX ADMIN — APIXABAN 5 MG: 5 TABLET, FILM COATED ORAL at 08:14

## 2024-09-13 RX ADMIN — BUMETANIDE 2 MG: 1 TABLET ORAL at 08:14

## 2024-09-13 RX ADMIN — BUDESONIDE AND FORMOTEROL FUMARATE DIHYDRATE 2 PUFF: 160; 4.5 AEROSOL RESPIRATORY (INHALATION) at 08:00

## 2024-09-13 RX ADMIN — EMPAGLIFLOZIN 10 MG: 10 TABLET, FILM COATED ORAL at 08:15

## 2024-09-13 RX ADMIN — PENTOXIFYLLINE 400 MG: 400 TABLET, EXTENDED RELEASE ORAL at 08:15

## 2024-09-13 RX ADMIN — HYDRALAZINE HYDROCHLORIDE 50 MG: 50 TABLET ORAL at 23:58

## 2024-09-13 RX ADMIN — ACETAMINOPHEN 650 MG: 325 TABLET ORAL at 20:41

## 2024-09-13 RX ADMIN — HYDROCODONE BITARTRATE AND ACETAMINOPHEN 1 TABLET: 5; 325 TABLET ORAL at 12:20

## 2024-09-13 RX ADMIN — GUAIFENESIN 600 MG: 600 TABLET, EXTENDED RELEASE ORAL at 20:41

## 2024-09-13 RX ADMIN — SODIUM CHLORIDE 2 G: 1 TABLET ORAL at 09:30

## 2024-09-13 RX ADMIN — PENTOXIFYLLINE 400 MG: 400 TABLET, EXTENDED RELEASE ORAL at 11:39

## 2024-09-13 RX ADMIN — IPRATROPIUM BROMIDE AND ALBUTEROL SULFATE 1 DOSE: 2.5; .5 SOLUTION RESPIRATORY (INHALATION) at 20:10

## 2024-09-13 RX ADMIN — GUAIFENESIN 600 MG: 600 TABLET, EXTENDED RELEASE ORAL at 08:14

## 2024-09-13 RX ADMIN — HYDRALAZINE HYDROCHLORIDE 50 MG: 50 TABLET ORAL at 06:17

## 2024-09-13 RX ADMIN — BACITRACIN: 500 OINTMENT TOPICAL at 08:14

## 2024-09-13 RX ADMIN — ATORVASTATIN CALCIUM 40 MG: 80 TABLET, FILM COATED ORAL at 20:41

## 2024-09-13 RX ADMIN — PENTOXIFYLLINE 400 MG: 400 TABLET, EXTENDED RELEASE ORAL at 15:58

## 2024-09-13 RX ADMIN — BUDESONIDE AND FORMOTEROL FUMARATE DIHYDRATE 2 PUFF: 160; 4.5 AEROSOL RESPIRATORY (INHALATION) at 20:10

## 2024-09-13 RX ADMIN — HYDROCODONE BITARTRATE AND ACETAMINOPHEN 1 TABLET: 5; 325 TABLET ORAL at 17:43

## 2024-09-13 RX ADMIN — SILVER SULFADIAZINE: 10 CREAM TOPICAL at 20:40

## 2024-09-13 RX ADMIN — FERROUS SULFATE TAB EC 325 MG (65 MG FE EQUIVALENT) 325 MG: 325 (65 FE) TABLET DELAYED RESPONSE at 08:14

## 2024-09-13 RX ADMIN — ACETAMINOPHEN 650 MG: 325 TABLET ORAL at 08:13

## 2024-09-13 RX ADMIN — BACITRACIN: 500 OINTMENT TOPICAL at 20:41

## 2024-09-13 RX ADMIN — APIXABAN 5 MG: 5 TABLET, FILM COATED ORAL at 20:41

## 2024-09-13 RX ADMIN — SILVER SULFADIAZINE: 10 CREAM TOPICAL at 08:16

## 2024-09-13 ASSESSMENT — PAIN DESCRIPTION - LOCATION
LOCATION: HEAD
LOCATION: FOOT;LEG
LOCATION: FOOT;LEG
LOCATION: HEAD

## 2024-09-13 ASSESSMENT — PAIN SCALES - GENERAL
PAINLEVEL_OUTOF10: 8
PAINLEVEL_OUTOF10: 5
PAINLEVEL_OUTOF10: 7
PAINLEVEL_OUTOF10: 3
PAINLEVEL_OUTOF10: 7
PAINLEVEL_OUTOF10: 6

## 2024-09-13 ASSESSMENT — PAIN DESCRIPTION - ORIENTATION
ORIENTATION: RIGHT;LEFT
ORIENTATION: RIGHT;LEFT

## 2024-09-13 ASSESSMENT — PAIN DESCRIPTION - DESCRIPTORS
DESCRIPTORS: ACHING
DESCRIPTORS: ACHING;DISCOMFORT;SORE
DESCRIPTORS: ACHING;DISCOMFORT;SORE;TENDER
DESCRIPTORS: ACHING;DISCOMFORT

## 2024-09-13 ASSESSMENT — PAIN - FUNCTIONAL ASSESSMENT
PAIN_FUNCTIONAL_ASSESSMENT: PREVENTS OR INTERFERES SOME ACTIVE ACTIVITIES AND ADLS
PAIN_FUNCTIONAL_ASSESSMENT: PREVENTS OR INTERFERES SOME ACTIVE ACTIVITIES AND ADLS
PAIN_FUNCTIONAL_ASSESSMENT: ACTIVITIES ARE NOT PREVENTED

## 2024-09-14 LAB
ANION GAP SERPL CALCULATED.3IONS-SCNC: 11 MMOL/L (ref 9–16)
BUN SERPL-MCNC: 39 MG/DL (ref 8–23)
CALCIUM SERPL-MCNC: 8.6 MG/DL (ref 8.6–10.4)
CHLORIDE SERPL-SCNC: 95 MMOL/L (ref 98–107)
CO2 SERPL-SCNC: 25 MMOL/L (ref 20–31)
CREAT SERPL-MCNC: 1.8 MG/DL (ref 0.5–0.9)
GFR, ESTIMATED: 30 ML/MIN/1.73M2
GLUCOSE BLD-MCNC: 105 MG/DL (ref 65–105)
GLUCOSE BLD-MCNC: 119 MG/DL (ref 65–105)
GLUCOSE BLD-MCNC: 119 MG/DL (ref 65–105)
GLUCOSE SERPL-MCNC: 122 MG/DL (ref 74–99)
MAGNESIUM SERPL-MCNC: 2.2 MG/DL (ref 1.6–2.4)
POTASSIUM SERPL-SCNC: 4.3 MMOL/L (ref 3.7–5.3)
SODIUM SERPL-SCNC: 131 MMOL/L (ref 136–145)

## 2024-09-14 PROCEDURE — 82947 ASSAY GLUCOSE BLOOD QUANT: CPT

## 2024-09-14 PROCEDURE — 6370000000 HC RX 637 (ALT 250 FOR IP): Performed by: INTERNAL MEDICINE

## 2024-09-14 PROCEDURE — 2700000000 HC OXYGEN THERAPY PER DAY

## 2024-09-14 PROCEDURE — 94761 N-INVAS EAR/PLS OXIMETRY MLT: CPT

## 2024-09-14 PROCEDURE — 80048 BASIC METABOLIC PNL TOTAL CA: CPT

## 2024-09-14 PROCEDURE — 83735 ASSAY OF MAGNESIUM: CPT

## 2024-09-14 PROCEDURE — 99232 SBSQ HOSP IP/OBS MODERATE 35: CPT | Performed by: NURSE PRACTITIONER

## 2024-09-14 PROCEDURE — 6370000000 HC RX 637 (ALT 250 FOR IP): Performed by: NURSE PRACTITIONER

## 2024-09-14 PROCEDURE — 6370000000 HC RX 637 (ALT 250 FOR IP): Performed by: STUDENT IN AN ORGANIZED HEALTH CARE EDUCATION/TRAINING PROGRAM

## 2024-09-14 PROCEDURE — 1200000000 HC SEMI PRIVATE

## 2024-09-14 PROCEDURE — 36415 COLL VENOUS BLD VENIPUNCTURE: CPT

## 2024-09-14 PROCEDURE — 94640 AIRWAY INHALATION TREATMENT: CPT

## 2024-09-14 PROCEDURE — 51798 US URINE CAPACITY MEASURE: CPT

## 2024-09-14 RX ORDER — SODIUM CHLORIDE 1 G/1
2 TABLET ORAL ONCE
Status: COMPLETED | OUTPATIENT
Start: 2024-09-14 | End: 2024-09-14

## 2024-09-14 RX ORDER — SODIUM CHLORIDE 1 G/1
TABLET ORAL
Status: DISPENSED
Start: 2024-09-14 | End: 2024-09-14

## 2024-09-14 RX ADMIN — APIXABAN 5 MG: 5 TABLET, FILM COATED ORAL at 20:35

## 2024-09-14 RX ADMIN — PENTOXIFYLLINE 400 MG: 400 TABLET, EXTENDED RELEASE ORAL at 12:13

## 2024-09-14 RX ADMIN — SODIUM CHLORIDE 2 G: 1 TABLET ORAL at 10:55

## 2024-09-14 RX ADMIN — BUDESONIDE AND FORMOTEROL FUMARATE DIHYDRATE 2 PUFF: 160; 4.5 AEROSOL RESPIRATORY (INHALATION) at 07:34

## 2024-09-14 RX ADMIN — FERROUS SULFATE TAB EC 325 MG (65 MG FE EQUIVALENT) 325 MG: 325 (65 FE) TABLET DELAYED RESPONSE at 08:53

## 2024-09-14 RX ADMIN — IPRATROPIUM BROMIDE AND ALBUTEROL SULFATE 1 DOSE: 2.5; .5 SOLUTION RESPIRATORY (INHALATION) at 07:34

## 2024-09-14 RX ADMIN — ACETAMINOPHEN, ASPIRIN, CAFFEINE 1 TABLET: 250; 250; 65 TABLET, FILM COATED ORAL at 18:20

## 2024-09-14 RX ADMIN — GUAIFENESIN 600 MG: 600 TABLET, EXTENDED RELEASE ORAL at 20:35

## 2024-09-14 RX ADMIN — SILVER SULFADIAZINE: 10 CREAM TOPICAL at 20:36

## 2024-09-14 RX ADMIN — BACITRACIN: 500 OINTMENT TOPICAL at 09:46

## 2024-09-14 RX ADMIN — HYDRALAZINE HYDROCHLORIDE 50 MG: 50 TABLET ORAL at 06:28

## 2024-09-14 RX ADMIN — BACITRACIN: 500 OINTMENT TOPICAL at 20:35

## 2024-09-14 RX ADMIN — BUMETANIDE 2 MG: 1 TABLET ORAL at 08:52

## 2024-09-14 RX ADMIN — ATORVASTATIN CALCIUM 40 MG: 80 TABLET, FILM COATED ORAL at 20:36

## 2024-09-14 RX ADMIN — ACETAMINOPHEN, ASPIRIN, CAFFEINE 1 TABLET: 250; 250; 65 TABLET, FILM COATED ORAL at 08:53

## 2024-09-14 RX ADMIN — GUAIFENESIN 600 MG: 600 TABLET, EXTENDED RELEASE ORAL at 08:52

## 2024-09-14 RX ADMIN — Medication 3 MG: at 20:35

## 2024-09-14 RX ADMIN — BUDESONIDE AND FORMOTEROL FUMARATE DIHYDRATE 2 PUFF: 160; 4.5 AEROSOL RESPIRATORY (INHALATION) at 19:57

## 2024-09-14 RX ADMIN — CARVEDILOL 12.5 MG: 12.5 TABLET, FILM COATED ORAL at 08:53

## 2024-09-14 RX ADMIN — APIXABAN 5 MG: 5 TABLET, FILM COATED ORAL at 08:53

## 2024-09-14 RX ADMIN — IPRATROPIUM BROMIDE AND ALBUTEROL SULFATE 1 DOSE: 2.5; .5 SOLUTION RESPIRATORY (INHALATION) at 19:58

## 2024-09-14 RX ADMIN — SILVER SULFADIAZINE: 10 CREAM TOPICAL at 09:46

## 2024-09-14 RX ADMIN — CARVEDILOL 12.5 MG: 12.5 TABLET, FILM COATED ORAL at 16:29

## 2024-09-14 RX ADMIN — HYDROCODONE BITARTRATE AND ACETAMINOPHEN 2 TABLET: 5; 325 TABLET ORAL at 20:35

## 2024-09-14 RX ADMIN — HYDRALAZINE HYDROCHLORIDE 50 MG: 50 TABLET ORAL at 20:36

## 2024-09-14 RX ADMIN — EMPAGLIFLOZIN 10 MG: 10 TABLET, FILM COATED ORAL at 08:52

## 2024-09-14 RX ADMIN — HYDROCODONE BITARTRATE AND ACETAMINOPHEN 2 TABLET: 5; 325 TABLET ORAL at 06:27

## 2024-09-14 RX ADMIN — PENTOXIFYLLINE 400 MG: 400 TABLET, EXTENDED RELEASE ORAL at 08:53

## 2024-09-14 RX ADMIN — HYDRALAZINE HYDROCHLORIDE 50 MG: 50 TABLET ORAL at 14:03

## 2024-09-14 RX ADMIN — PENTOXIFYLLINE 400 MG: 400 TABLET, EXTENDED RELEASE ORAL at 16:29

## 2024-09-14 ASSESSMENT — PAIN DESCRIPTION - DESCRIPTORS
DESCRIPTORS: THROBBING
DESCRIPTORS: ACHING
DESCRIPTORS: ACHING;NAGGING
DESCRIPTORS: DISCOMFORT

## 2024-09-14 ASSESSMENT — PAIN DESCRIPTION - LOCATION
LOCATION: HEAD
LOCATION: HEAD;FOOT
LOCATION: HEAD

## 2024-09-14 ASSESSMENT — PAIN SCALES - GENERAL
PAINLEVEL_OUTOF10: 7
PAINLEVEL_OUTOF10: 8
PAINLEVEL_OUTOF10: 8
PAINLEVEL_OUTOF10: 9
PAINLEVEL_OUTOF10: 10
PAINLEVEL_OUTOF10: 8
PAINLEVEL_OUTOF10: 10
PAINLEVEL_OUTOF10: 4
PAINLEVEL_OUTOF10: 8

## 2024-09-14 ASSESSMENT — PAIN DESCRIPTION - ORIENTATION
ORIENTATION: RIGHT
ORIENTATION: MID

## 2024-09-15 ENCOUNTER — APPOINTMENT (OUTPATIENT)
Dept: CT IMAGING | Age: 74
End: 2024-09-15

## 2024-09-15 LAB
ANION GAP SERPL CALCULATED.3IONS-SCNC: 12 MMOL/L (ref 9–16)
BUN SERPL-MCNC: 44 MG/DL (ref 8–23)
CALCIUM SERPL-MCNC: 8.6 MG/DL (ref 8.6–10.4)
CHLORIDE SERPL-SCNC: 96 MMOL/L (ref 98–107)
CO2 SERPL-SCNC: 22 MMOL/L (ref 20–31)
CREAT SERPL-MCNC: 2 MG/DL (ref 0.5–0.9)
D DIMER PPP FEU-MCNC: 0.98 UG/ML FEU (ref 0–0.57)
ERYTHROCYTE [DISTWIDTH] IN BLOOD BY AUTOMATED COUNT: 16 % (ref 11.8–14.4)
GFR, ESTIMATED: 26 ML/MIN/1.73M2
GLUCOSE BLD-MCNC: 112 MG/DL (ref 65–105)
GLUCOSE BLD-MCNC: 118 MG/DL (ref 65–105)
GLUCOSE BLD-MCNC: 118 MG/DL (ref 65–105)
GLUCOSE BLD-MCNC: 131 MG/DL (ref 65–105)
GLUCOSE SERPL-MCNC: 106 MG/DL (ref 74–99)
HCT VFR BLD AUTO: 37.3 % (ref 36.3–47.1)
HGB BLD-MCNC: 11.5 G/DL (ref 11.9–15.1)
MAGNESIUM SERPL-MCNC: 2.3 MG/DL (ref 1.6–2.4)
MCH RBC QN AUTO: 27.8 PG (ref 25.2–33.5)
MCHC RBC AUTO-ENTMCNC: 30.8 G/DL (ref 28.4–34.8)
MCV RBC AUTO: 90.1 FL (ref 82.6–102.9)
NRBC BLD-RTO: 0 PER 100 WBC
PLATELET # BLD AUTO: 296 K/UL (ref 138–453)
PMV BLD AUTO: 10.6 FL (ref 8.1–13.5)
POTASSIUM SERPL-SCNC: 3.9 MMOL/L (ref 3.7–5.3)
RBC # BLD AUTO: 4.14 M/UL (ref 3.95–5.11)
SARS-COV-2 RDRP RESP QL NAA+PROBE: NOT DETECTED
SODIUM SERPL-SCNC: 130 MMOL/L (ref 136–145)
SODIUM SERPL-SCNC: 130 MMOL/L (ref 136–145)
SPECIMEN DESCRIPTION: NORMAL
TROPONIN I SERPL HS-MCNC: 34 NG/L (ref 0–14)
WBC OTHER # BLD: 11.7 K/UL (ref 3.5–11.3)

## 2024-09-15 PROCEDURE — 83735 ASSAY OF MAGNESIUM: CPT

## 2024-09-15 PROCEDURE — 94640 AIRWAY INHALATION TREATMENT: CPT

## 2024-09-15 PROCEDURE — 6370000000 HC RX 637 (ALT 250 FOR IP): Performed by: NURSE PRACTITIONER

## 2024-09-15 PROCEDURE — 97530 THERAPEUTIC ACTIVITIES: CPT

## 2024-09-15 PROCEDURE — 97116 GAIT TRAINING THERAPY: CPT

## 2024-09-15 PROCEDURE — 82947 ASSAY GLUCOSE BLOOD QUANT: CPT

## 2024-09-15 PROCEDURE — 1200000000 HC SEMI PRIVATE

## 2024-09-15 PROCEDURE — 80048 BASIC METABOLIC PNL TOTAL CA: CPT

## 2024-09-15 PROCEDURE — 2700000000 HC OXYGEN THERAPY PER DAY

## 2024-09-15 PROCEDURE — 6370000000 HC RX 637 (ALT 250 FOR IP): Performed by: INTERNAL MEDICINE

## 2024-09-15 PROCEDURE — 84484 ASSAY OF TROPONIN QUANT: CPT

## 2024-09-15 PROCEDURE — 36415 COLL VENOUS BLD VENIPUNCTURE: CPT

## 2024-09-15 PROCEDURE — 94761 N-INVAS EAR/PLS OXIMETRY MLT: CPT

## 2024-09-15 PROCEDURE — 84295 ASSAY OF SERUM SODIUM: CPT

## 2024-09-15 PROCEDURE — 99232 SBSQ HOSP IP/OBS MODERATE 35: CPT | Performed by: NURSE PRACTITIONER

## 2024-09-15 PROCEDURE — 87635 SARS-COV-2 COVID-19 AMP PRB: CPT

## 2024-09-15 PROCEDURE — 85379 FIBRIN DEGRADATION QUANT: CPT

## 2024-09-15 PROCEDURE — 2580000003 HC RX 258: Performed by: NURSE PRACTITIONER

## 2024-09-15 PROCEDURE — 70450 CT HEAD/BRAIN W/O DYE: CPT

## 2024-09-15 PROCEDURE — 85027 COMPLETE CBC AUTOMATED: CPT

## 2024-09-15 RX ORDER — SODIUM CHLORIDE 1 G/1
2 TABLET ORAL 2 TIMES DAILY WITH MEALS
Status: DISCONTINUED | OUTPATIENT
Start: 2024-09-15 | End: 2024-09-20 | Stop reason: HOSPADM

## 2024-09-15 RX ADMIN — IPRATROPIUM BROMIDE AND ALBUTEROL SULFATE 1 DOSE: 2.5; .5 SOLUTION RESPIRATORY (INHALATION) at 19:15

## 2024-09-15 RX ADMIN — BUDESONIDE AND FORMOTEROL FUMARATE DIHYDRATE 2 PUFF: 160; 4.5 AEROSOL RESPIRATORY (INHALATION) at 07:54

## 2024-09-15 RX ADMIN — EMPAGLIFLOZIN 10 MG: 10 TABLET, FILM COATED ORAL at 08:30

## 2024-09-15 RX ADMIN — GUAIFENESIN 600 MG: 600 TABLET, EXTENDED RELEASE ORAL at 08:30

## 2024-09-15 RX ADMIN — CARVEDILOL 12.5 MG: 12.5 TABLET, FILM COATED ORAL at 08:31

## 2024-09-15 RX ADMIN — SILVER SULFADIAZINE: 10 CREAM TOPICAL at 21:57

## 2024-09-15 RX ADMIN — ATORVASTATIN CALCIUM 40 MG: 80 TABLET, FILM COATED ORAL at 21:56

## 2024-09-15 RX ADMIN — ACETAMINOPHEN, ASPIRIN, CAFFEINE 1 TABLET: 250; 250; 65 TABLET, FILM COATED ORAL at 14:49

## 2024-09-15 RX ADMIN — BACITRACIN: 500 OINTMENT TOPICAL at 08:38

## 2024-09-15 RX ADMIN — BUMETANIDE 2 MG: 1 TABLET ORAL at 08:31

## 2024-09-15 RX ADMIN — PENTOXIFYLLINE 400 MG: 400 TABLET, EXTENDED RELEASE ORAL at 17:02

## 2024-09-15 RX ADMIN — HYDROCODONE BITARTRATE AND ACETAMINOPHEN 2 TABLET: 5; 325 TABLET ORAL at 08:30

## 2024-09-15 RX ADMIN — BUDESONIDE AND FORMOTEROL FUMARATE DIHYDRATE 2 PUFF: 160; 4.5 AEROSOL RESPIRATORY (INHALATION) at 19:15

## 2024-09-15 RX ADMIN — FERROUS SULFATE TAB EC 325 MG (65 MG FE EQUIVALENT) 325 MG: 325 (65 FE) TABLET DELAYED RESPONSE at 08:31

## 2024-09-15 RX ADMIN — PENTOXIFYLLINE 400 MG: 400 TABLET, EXTENDED RELEASE ORAL at 08:30

## 2024-09-15 RX ADMIN — SODIUM CHLORIDE 2 G: 1 TABLET ORAL at 08:31

## 2024-09-15 RX ADMIN — ACETAMINOPHEN, ASPIRIN, CAFFEINE 1 TABLET: 250; 250; 65 TABLET, FILM COATED ORAL at 21:56

## 2024-09-15 RX ADMIN — SODIUM CHLORIDE 2 G: 1 TABLET ORAL at 17:02

## 2024-09-15 RX ADMIN — APIXABAN 5 MG: 5 TABLET, FILM COATED ORAL at 08:31

## 2024-09-15 RX ADMIN — CARVEDILOL 12.5 MG: 12.5 TABLET, FILM COATED ORAL at 17:02

## 2024-09-15 RX ADMIN — IPRATROPIUM BROMIDE AND ALBUTEROL SULFATE 1 DOSE: 2.5; .5 SOLUTION RESPIRATORY (INHALATION) at 07:54

## 2024-09-15 RX ADMIN — APIXABAN 5 MG: 5 TABLET, FILM COATED ORAL at 21:56

## 2024-09-15 RX ADMIN — BACITRACIN: 500 OINTMENT TOPICAL at 21:57

## 2024-09-15 RX ADMIN — PENTOXIFYLLINE 400 MG: 400 TABLET, EXTENDED RELEASE ORAL at 12:20

## 2024-09-15 RX ADMIN — GUAIFENESIN 600 MG: 600 TABLET, EXTENDED RELEASE ORAL at 21:56

## 2024-09-15 RX ADMIN — HYDRALAZINE HYDROCHLORIDE 50 MG: 50 TABLET ORAL at 21:56

## 2024-09-15 RX ADMIN — SODIUM CHLORIDE, PRESERVATIVE FREE 10 ML: 5 INJECTION INTRAVENOUS at 21:57

## 2024-09-15 RX ADMIN — SILVER SULFADIAZINE: 10 CREAM TOPICAL at 08:38

## 2024-09-15 RX ADMIN — HYDRALAZINE HYDROCHLORIDE 50 MG: 50 TABLET ORAL at 05:36

## 2024-09-15 ASSESSMENT — PAIN DESCRIPTION - DESCRIPTORS
DESCRIPTORS: DISCOMFORT
DESCRIPTORS: DISCOMFORT
DESCRIPTORS: ACHING

## 2024-09-15 ASSESSMENT — PAIN SCALES - GENERAL
PAINLEVEL_OUTOF10: 7
PAINLEVEL_OUTOF10: 5
PAINLEVEL_OUTOF10: 3
PAINLEVEL_OUTOF10: 4
PAINLEVEL_OUTOF10: 8

## 2024-09-15 ASSESSMENT — PAIN DESCRIPTION - LOCATION
LOCATION: HEAD
LOCATION: HEAD
LOCATION: GENERALIZED

## 2024-09-16 ENCOUNTER — APPOINTMENT (OUTPATIENT)
Dept: GENERAL RADIOLOGY | Age: 74
End: 2024-09-16

## 2024-09-16 ENCOUNTER — APPOINTMENT (OUTPATIENT)
Dept: NUCLEAR MEDICINE | Age: 74
End: 2024-09-16

## 2024-09-16 PROBLEM — Z51.5 PALLIATIVE CARE ENCOUNTER: Status: ACTIVE | Noted: 2024-09-16

## 2024-09-16 PROBLEM — Z71.89 DNR (DO NOT RESUSCITATE) DISCUSSION: Status: ACTIVE | Noted: 2024-09-16

## 2024-09-16 PROBLEM — Z71.89 GOALS OF CARE, COUNSELING/DISCUSSION: Status: ACTIVE | Noted: 2024-09-16

## 2024-09-16 LAB
ANION GAP SERPL CALCULATED.3IONS-SCNC: 11 MMOL/L (ref 9–16)
BUN SERPL-MCNC: 41 MG/DL (ref 8–23)
CALCIUM SERPL-MCNC: 8.4 MG/DL (ref 8.6–10.4)
CHLORIDE SERPL-SCNC: 97 MMOL/L (ref 98–107)
CO2 SERPL-SCNC: 25 MMOL/L (ref 20–31)
CREAT SERPL-MCNC: 1.8 MG/DL (ref 0.5–0.9)
EKG ATRIAL RATE: 70 BPM
EKG Q-T INTERVAL: 432 MS
EKG QRS DURATION: 142 MS
EKG QTC CALCULATION (BAZETT): 466 MS
EKG R AXIS: -75 DEGREES
EKG T AXIS: 105 DEGREES
EKG VENTRICULAR RATE: 70 BPM
ERYTHROCYTE [DISTWIDTH] IN BLOOD BY AUTOMATED COUNT: 16 % (ref 11.8–14.4)
GFR, ESTIMATED: 29 ML/MIN/1.73M2
GLUCOSE BLD-MCNC: 115 MG/DL (ref 65–105)
GLUCOSE BLD-MCNC: 137 MG/DL (ref 65–105)
GLUCOSE BLD-MCNC: 94 MG/DL (ref 65–105)
GLUCOSE SERPL-MCNC: 96 MG/DL (ref 74–99)
HCT VFR BLD AUTO: 34.5 % (ref 36.3–47.1)
HGB BLD-MCNC: 11 G/DL (ref 11.9–15.1)
MAGNESIUM SERPL-MCNC: 2.1 MG/DL (ref 1.6–2.4)
MCH RBC QN AUTO: 27.6 PG (ref 25.2–33.5)
MCHC RBC AUTO-ENTMCNC: 31.9 G/DL (ref 28.4–34.8)
MCV RBC AUTO: 86.5 FL (ref 82.6–102.9)
NRBC BLD-RTO: 0 PER 100 WBC
PLATELET # BLD AUTO: 302 K/UL (ref 138–453)
PMV BLD AUTO: 10.3 FL (ref 8.1–13.5)
POTASSIUM SERPL-SCNC: 3.4 MMOL/L (ref 3.7–5.3)
RBC # BLD AUTO: 3.99 M/UL (ref 3.95–5.11)
SODIUM SERPL-SCNC: 133 MMOL/L (ref 136–145)
WBC OTHER # BLD: 10.2 K/UL (ref 3.5–11.3)

## 2024-09-16 PROCEDURE — 94761 N-INVAS EAR/PLS OXIMETRY MLT: CPT

## 2024-09-16 PROCEDURE — 6370000000 HC RX 637 (ALT 250 FOR IP): Performed by: NURSE PRACTITIONER

## 2024-09-16 PROCEDURE — 6370000000 HC RX 637 (ALT 250 FOR IP): Performed by: INTERNAL MEDICINE

## 2024-09-16 PROCEDURE — 99232 SBSQ HOSP IP/OBS MODERATE 35: CPT | Performed by: INTERNAL MEDICINE

## 2024-09-16 PROCEDURE — 94640 AIRWAY INHALATION TREATMENT: CPT

## 2024-09-16 PROCEDURE — 82947 ASSAY GLUCOSE BLOOD QUANT: CPT

## 2024-09-16 PROCEDURE — A9538 TC99M PYROPHOSPHATE: HCPCS | Performed by: STUDENT IN AN ORGANIZED HEALTH CARE EDUCATION/TRAINING PROGRAM

## 2024-09-16 PROCEDURE — 83735 ASSAY OF MAGNESIUM: CPT

## 2024-09-16 PROCEDURE — 78582 LUNG VENTILAT&PERFUS IMAGING: CPT

## 2024-09-16 PROCEDURE — A9540 TC99M MAA: HCPCS | Performed by: STUDENT IN AN ORGANIZED HEALTH CARE EDUCATION/TRAINING PROGRAM

## 2024-09-16 PROCEDURE — 1200000000 HC SEMI PRIVATE

## 2024-09-16 PROCEDURE — 99222 1ST HOSP IP/OBS MODERATE 55: CPT | Performed by: NURSE PRACTITIONER

## 2024-09-16 PROCEDURE — 2580000003 HC RX 258: Performed by: NURSE PRACTITIONER

## 2024-09-16 PROCEDURE — 71045 X-RAY EXAM CHEST 1 VIEW: CPT

## 2024-09-16 PROCEDURE — 3430000000 HC RX DIAGNOSTIC RADIOPHARMACEUTICAL: Performed by: STUDENT IN AN ORGANIZED HEALTH CARE EDUCATION/TRAINING PROGRAM

## 2024-09-16 PROCEDURE — 2700000000 HC OXYGEN THERAPY PER DAY

## 2024-09-16 PROCEDURE — 2580000003 HC RX 258: Performed by: STUDENT IN AN ORGANIZED HEALTH CARE EDUCATION/TRAINING PROGRAM

## 2024-09-16 PROCEDURE — 99232 SBSQ HOSP IP/OBS MODERATE 35: CPT | Performed by: NURSE PRACTITIONER

## 2024-09-16 PROCEDURE — 36415 COLL VENOUS BLD VENIPUNCTURE: CPT

## 2024-09-16 PROCEDURE — 6370000000 HC RX 637 (ALT 250 FOR IP)

## 2024-09-16 PROCEDURE — 87086 URINE CULTURE/COLONY COUNT: CPT

## 2024-09-16 PROCEDURE — 2500000003 HC RX 250 WO HCPCS: Performed by: STUDENT IN AN ORGANIZED HEALTH CARE EDUCATION/TRAINING PROGRAM

## 2024-09-16 PROCEDURE — 85027 COMPLETE CBC AUTOMATED: CPT

## 2024-09-16 PROCEDURE — 80048 BASIC METABOLIC PNL TOTAL CA: CPT

## 2024-09-16 RX ORDER — SODIUM CHLORIDE 0.9 % (FLUSH) 0.9 %
10 SYRINGE (ML) INJECTION PRN
Status: DISCONTINUED | OUTPATIENT
Start: 2024-09-16 | End: 2024-09-20 | Stop reason: HOSPADM

## 2024-09-16 RX ORDER — POTASSIUM CHLORIDE 1500 MG/1
20 TABLET, EXTENDED RELEASE ORAL ONCE
Status: COMPLETED | OUTPATIENT
Start: 2024-09-16 | End: 2024-09-16

## 2024-09-16 RX ADMIN — SODIUM CHLORIDE 2 G: 1 TABLET ORAL at 18:20

## 2024-09-16 RX ADMIN — HYDRALAZINE HYDROCHLORIDE 50 MG: 50 TABLET ORAL at 05:45

## 2024-09-16 RX ADMIN — BACITRACIN: 500 OINTMENT TOPICAL at 13:32

## 2024-09-16 RX ADMIN — APIXABAN 5 MG: 5 TABLET, FILM COATED ORAL at 08:53

## 2024-09-16 RX ADMIN — SODIUM CHLORIDE 2 G: 1 TABLET ORAL at 08:53

## 2024-09-16 RX ADMIN — GUAIFENESIN 600 MG: 600 TABLET, EXTENDED RELEASE ORAL at 08:53

## 2024-09-16 RX ADMIN — HYDRALAZINE HYDROCHLORIDE 50 MG: 50 TABLET ORAL at 21:57

## 2024-09-16 RX ADMIN — PENTOXIFYLLINE 400 MG: 400 TABLET, EXTENDED RELEASE ORAL at 13:27

## 2024-09-16 RX ADMIN — FERROUS SULFATE TAB EC 325 MG (65 MG FE EQUIVALENT) 325 MG: 325 (65 FE) TABLET DELAYED RESPONSE at 08:53

## 2024-09-16 RX ADMIN — HYDROCODONE BITARTRATE AND ACETAMINOPHEN 2 TABLET: 5; 325 TABLET ORAL at 13:31

## 2024-09-16 RX ADMIN — SILVER SULFADIAZINE: 10 CREAM TOPICAL at 13:32

## 2024-09-16 RX ADMIN — CARVEDILOL 12.5 MG: 12.5 TABLET, FILM COATED ORAL at 08:53

## 2024-09-16 RX ADMIN — IPRATROPIUM BROMIDE AND ALBUTEROL SULFATE 1 DOSE: 2.5; .5 SOLUTION RESPIRATORY (INHALATION) at 20:14

## 2024-09-16 RX ADMIN — PENTOXIFYLLINE 400 MG: 400 TABLET, EXTENDED RELEASE ORAL at 18:20

## 2024-09-16 RX ADMIN — IPRATROPIUM BROMIDE AND ALBUTEROL SULFATE 1 DOSE: 2.5; .5 SOLUTION RESPIRATORY (INHALATION) at 08:07

## 2024-09-16 RX ADMIN — Medication 50 MILLICURIE: at 10:50

## 2024-09-16 RX ADMIN — SODIUM CHLORIDE, PRESERVATIVE FREE 10 ML: 5 INJECTION INTRAVENOUS at 20:05

## 2024-09-16 RX ADMIN — HYDROCODONE BITARTRATE AND ACETAMINOPHEN 2 TABLET: 5; 325 TABLET ORAL at 20:57

## 2024-09-16 RX ADMIN — BUMETANIDE 2 MG: 1 TABLET ORAL at 08:53

## 2024-09-16 RX ADMIN — PENTOXIFYLLINE 400 MG: 400 TABLET, EXTENDED RELEASE ORAL at 08:53

## 2024-09-16 RX ADMIN — APIXABAN 5 MG: 5 TABLET, FILM COATED ORAL at 20:57

## 2024-09-16 RX ADMIN — BUDESONIDE AND FORMOTEROL FUMARATE DIHYDRATE 2 PUFF: 160; 4.5 AEROSOL RESPIRATORY (INHALATION) at 20:14

## 2024-09-16 RX ADMIN — EMPAGLIFLOZIN 10 MG: 10 TABLET, FILM COATED ORAL at 08:53

## 2024-09-16 RX ADMIN — SODIUM CHLORIDE, PRESERVATIVE FREE 10 ML: 5 INJECTION INTRAVENOUS at 08:55

## 2024-09-16 RX ADMIN — SODIUM CHLORIDE, PRESERVATIVE FREE 10 ML: 5 INJECTION INTRAVENOUS at 11:06

## 2024-09-16 RX ADMIN — ATORVASTATIN CALCIUM 40 MG: 80 TABLET, FILM COATED ORAL at 20:04

## 2024-09-16 RX ADMIN — SILVER SULFADIAZINE: 10 CREAM TOPICAL at 20:04

## 2024-09-16 RX ADMIN — BACITRACIN: 500 OINTMENT TOPICAL at 20:04

## 2024-09-16 RX ADMIN — HYDROCODONE BITARTRATE AND ACETAMINOPHEN 2 TABLET: 5; 325 TABLET ORAL at 09:56

## 2024-09-16 RX ADMIN — GUAIFENESIN 600 MG: 600 TABLET, EXTENDED RELEASE ORAL at 20:04

## 2024-09-16 RX ADMIN — Medication 3 MG: at 20:57

## 2024-09-16 RX ADMIN — Medication 5 MILLICURIE: at 11:06

## 2024-09-16 RX ADMIN — POTASSIUM CHLORIDE 20 MEQ: 1500 TABLET, EXTENDED RELEASE ORAL at 05:45

## 2024-09-16 ASSESSMENT — PAIN - FUNCTIONAL ASSESSMENT
PAIN_FUNCTIONAL_ASSESSMENT: ACTIVITIES ARE NOT PREVENTED
PAIN_FUNCTIONAL_ASSESSMENT: PREVENTS OR INTERFERES SOME ACTIVE ACTIVITIES AND ADLS

## 2024-09-16 ASSESSMENT — PAIN DESCRIPTION - DESCRIPTORS
DESCRIPTORS: ACHING
DESCRIPTORS: SORE
DESCRIPTORS: ACHING;DISCOMFORT

## 2024-09-16 ASSESSMENT — PAIN SCALES - GENERAL
PAINLEVEL_OUTOF10: 9
PAINLEVEL_OUTOF10: 3
PAINLEVEL_OUTOF10: 5
PAINLEVEL_OUTOF10: 5
PAINLEVEL_OUTOF10: 8
PAINLEVEL_OUTOF10: 8

## 2024-09-16 ASSESSMENT — PAIN DESCRIPTION - LOCATION
LOCATION: NECK
LOCATION: CHEST
LOCATION: HEAD

## 2024-09-16 ASSESSMENT — PAIN DESCRIPTION - ORIENTATION: ORIENTATION: MID;LOWER

## 2024-09-17 LAB
ANION GAP SERPL CALCULATED.3IONS-SCNC: 11 MMOL/L (ref 9–16)
BACTERIA URNS QL MICRO: ABNORMAL
BILIRUB UR QL STRIP: NEGATIVE
BUN SERPL-MCNC: 43 MG/DL (ref 8–23)
CALCIUM SERPL-MCNC: 8.6 MG/DL (ref 8.6–10.4)
CASTS #/AREA URNS LPF: ABNORMAL /LPF (ref 0–2)
CHLORIDE SERPL-SCNC: 97 MMOL/L (ref 98–107)
CLARITY UR: ABNORMAL
CO2 SERPL-SCNC: 22 MMOL/L (ref 20–31)
COLOR UR: YELLOW
CREAT SERPL-MCNC: 1.8 MG/DL (ref 0.5–0.9)
EPI CELLS #/AREA URNS HPF: ABNORMAL /HPF (ref 0–5)
ERYTHROCYTE [DISTWIDTH] IN BLOOD BY AUTOMATED COUNT: 16.1 % (ref 11.8–14.4)
GFR, ESTIMATED: 29 ML/MIN/1.73M2
GLUCOSE BLD-MCNC: 110 MG/DL (ref 65–105)
GLUCOSE BLD-MCNC: 121 MG/DL (ref 65–105)
GLUCOSE BLD-MCNC: 121 MG/DL (ref 65–105)
GLUCOSE BLD-MCNC: 137 MG/DL (ref 65–105)
GLUCOSE SERPL-MCNC: 123 MG/DL (ref 74–99)
GLUCOSE UR STRIP-MCNC: ABNORMAL MG/DL
HCT VFR BLD AUTO: 39 % (ref 36.3–47.1)
HGB BLD-MCNC: 12.1 G/DL (ref 11.9–15.1)
HGB UR QL STRIP.AUTO: NEGATIVE
KETONES UR STRIP-MCNC: NEGATIVE MG/DL
LEUKOCYTE ESTERASE UR QL STRIP: ABNORMAL
MCH RBC QN AUTO: 28.5 PG (ref 25.2–33.5)
MCHC RBC AUTO-ENTMCNC: 31 G/DL (ref 28.4–34.8)
MCV RBC AUTO: 91.8 FL (ref 82.6–102.9)
NITRITE UR QL STRIP: NEGATIVE
NRBC BLD-RTO: 0 PER 100 WBC
PH UR STRIP: 5.5 [PH] (ref 5–8)
PLATELET # BLD AUTO: 328 K/UL (ref 138–453)
PMV BLD AUTO: 10.8 FL (ref 8.1–13.5)
POTASSIUM SERPL-SCNC: 4.1 MMOL/L (ref 3.7–5.3)
PROT UR STRIP-MCNC: ABNORMAL MG/DL
RBC # BLD AUTO: 4.25 M/UL (ref 3.95–5.11)
RBC #/AREA URNS HPF: ABNORMAL /HPF (ref 0–2)
SODIUM SERPL-SCNC: 130 MMOL/L (ref 136–145)
SP GR UR STRIP: 1.02 (ref 1–1.03)
UROBILINOGEN UR STRIP-ACNC: NORMAL EU/DL (ref 0–1)
WBC #/AREA URNS HPF: ABNORMAL /HPF (ref 0–5)
WBC OTHER # BLD: 13.2 K/UL (ref 3.5–11.3)
YEAST URNS QL MICRO: ABNORMAL

## 2024-09-17 PROCEDURE — 2580000003 HC RX 258: Performed by: STUDENT IN AN ORGANIZED HEALTH CARE EDUCATION/TRAINING PROGRAM

## 2024-09-17 PROCEDURE — 6360000002 HC RX W HCPCS: Performed by: INTERNAL MEDICINE

## 2024-09-17 PROCEDURE — 36415 COLL VENOUS BLD VENIPUNCTURE: CPT

## 2024-09-17 PROCEDURE — 6370000000 HC RX 637 (ALT 250 FOR IP): Performed by: NURSE PRACTITIONER

## 2024-09-17 PROCEDURE — 99232 SBSQ HOSP IP/OBS MODERATE 35: CPT | Performed by: NURSE PRACTITIONER

## 2024-09-17 PROCEDURE — 97530 THERAPEUTIC ACTIVITIES: CPT

## 2024-09-17 PROCEDURE — 1200000000 HC SEMI PRIVATE

## 2024-09-17 PROCEDURE — 80048 BASIC METABOLIC PNL TOTAL CA: CPT

## 2024-09-17 PROCEDURE — 6370000000 HC RX 637 (ALT 250 FOR IP): Performed by: INTERNAL MEDICINE

## 2024-09-17 PROCEDURE — 94640 AIRWAY INHALATION TREATMENT: CPT

## 2024-09-17 PROCEDURE — 6370000000 HC RX 637 (ALT 250 FOR IP): Performed by: STUDENT IN AN ORGANIZED HEALTH CARE EDUCATION/TRAINING PROGRAM

## 2024-09-17 PROCEDURE — 94761 N-INVAS EAR/PLS OXIMETRY MLT: CPT

## 2024-09-17 PROCEDURE — 2700000000 HC OXYGEN THERAPY PER DAY

## 2024-09-17 PROCEDURE — 85027 COMPLETE CBC AUTOMATED: CPT

## 2024-09-17 PROCEDURE — 81001 URINALYSIS AUTO W/SCOPE: CPT

## 2024-09-17 PROCEDURE — 2580000003 HC RX 258: Performed by: NURSE PRACTITIONER

## 2024-09-17 PROCEDURE — 82947 ASSAY GLUCOSE BLOOD QUANT: CPT

## 2024-09-17 RX ORDER — SODIUM CHLORIDE 1 G/1
1 TABLET ORAL 2 TIMES DAILY WITH MEALS
Qty: 10 TABLET | Refills: 0 | Status: SHIPPED | OUTPATIENT
Start: 2024-09-17 | End: 2024-09-18

## 2024-09-17 RX ORDER — HYDRALAZINE HYDROCHLORIDE 20 MG/ML
20 INJECTION INTRAMUSCULAR; INTRAVENOUS EVERY 4 HOURS PRN
Status: DISCONTINUED | OUTPATIENT
Start: 2024-09-17 | End: 2024-09-20 | Stop reason: HOSPADM

## 2024-09-17 RX ORDER — LABETALOL HYDROCHLORIDE 5 MG/ML
20 INJECTION, SOLUTION INTRAVENOUS
Status: DISCONTINUED | OUTPATIENT
Start: 2024-09-17 | End: 2024-09-20 | Stop reason: HOSPADM

## 2024-09-17 RX ADMIN — SILVER SULFADIAZINE: 10 CREAM TOPICAL at 19:29

## 2024-09-17 RX ADMIN — HYDRALAZINE HYDROCHLORIDE 20 MG: 20 INJECTION INTRAMUSCULAR; INTRAVENOUS at 06:29

## 2024-09-17 RX ADMIN — ATORVASTATIN CALCIUM 40 MG: 80 TABLET, FILM COATED ORAL at 19:28

## 2024-09-17 RX ADMIN — BUDESONIDE AND FORMOTEROL FUMARATE DIHYDRATE 2 PUFF: 160; 4.5 AEROSOL RESPIRATORY (INHALATION) at 21:25

## 2024-09-17 RX ADMIN — BACITRACIN: 500 OINTMENT TOPICAL at 19:28

## 2024-09-17 RX ADMIN — HYDROCODONE BITARTRATE AND ACETAMINOPHEN 1 TABLET: 5; 325 TABLET ORAL at 14:07

## 2024-09-17 RX ADMIN — PENTOXIFYLLINE 400 MG: 400 TABLET, EXTENDED RELEASE ORAL at 17:42

## 2024-09-17 RX ADMIN — IPRATROPIUM BROMIDE AND ALBUTEROL SULFATE 1 DOSE: 2.5; .5 SOLUTION RESPIRATORY (INHALATION) at 21:24

## 2024-09-17 RX ADMIN — APIXABAN 5 MG: 5 TABLET, FILM COATED ORAL at 20:57

## 2024-09-17 RX ADMIN — HYDRALAZINE HYDROCHLORIDE 50 MG: 50 TABLET ORAL at 05:02

## 2024-09-17 RX ADMIN — SILVER SULFADIAZINE: 10 CREAM TOPICAL at 09:11

## 2024-09-17 RX ADMIN — GUAIFENESIN 600 MG: 600 TABLET, EXTENDED RELEASE ORAL at 09:11

## 2024-09-17 RX ADMIN — Medication 3 MG: at 20:55

## 2024-09-17 RX ADMIN — SODIUM CHLORIDE 2 G: 1 TABLET ORAL at 17:42

## 2024-09-17 RX ADMIN — SODIUM CHLORIDE, PRESERVATIVE FREE 10 ML: 5 INJECTION INTRAVENOUS at 19:29

## 2024-09-17 RX ADMIN — BUDESONIDE AND FORMOTEROL FUMARATE DIHYDRATE 2 PUFF: 160; 4.5 AEROSOL RESPIRATORY (INHALATION) at 08:35

## 2024-09-17 RX ADMIN — SODIUM CHLORIDE, PRESERVATIVE FREE 10 ML: 5 INJECTION INTRAVENOUS at 09:12

## 2024-09-17 RX ADMIN — HYDRALAZINE HYDROCHLORIDE 50 MG: 50 TABLET ORAL at 20:55

## 2024-09-17 RX ADMIN — PENTOXIFYLLINE 400 MG: 400 TABLET, EXTENDED RELEASE ORAL at 13:05

## 2024-09-17 RX ADMIN — FERROUS SULFATE TAB EC 325 MG (65 MG FE EQUIVALENT) 325 MG: 325 (65 FE) TABLET DELAYED RESPONSE at 09:11

## 2024-09-17 RX ADMIN — HYDRALAZINE HYDROCHLORIDE 50 MG: 50 TABLET ORAL at 13:05

## 2024-09-17 RX ADMIN — SODIUM CHLORIDE, PRESERVATIVE FREE 10 ML: 5 INJECTION INTRAVENOUS at 09:11

## 2024-09-17 RX ADMIN — HYDROCODONE BITARTRATE AND ACETAMINOPHEN 2 TABLET: 5; 325 TABLET ORAL at 09:09

## 2024-09-17 RX ADMIN — HYDROCODONE BITARTRATE AND ACETAMINOPHEN 1 TABLET: 5; 325 TABLET ORAL at 20:55

## 2024-09-17 RX ADMIN — APIXABAN 5 MG: 5 TABLET, FILM COATED ORAL at 09:11

## 2024-09-17 RX ADMIN — GUAIFENESIN 600 MG: 600 TABLET, EXTENDED RELEASE ORAL at 19:28

## 2024-09-17 RX ADMIN — IPRATROPIUM BROMIDE AND ALBUTEROL SULFATE 1 DOSE: 2.5; .5 SOLUTION RESPIRATORY (INHALATION) at 08:35

## 2024-09-17 RX ADMIN — BUMETANIDE 2 MG: 1 TABLET ORAL at 09:10

## 2024-09-17 RX ADMIN — BACITRACIN: 500 OINTMENT TOPICAL at 09:10

## 2024-09-17 RX ADMIN — SODIUM CHLORIDE 2 G: 1 TABLET ORAL at 09:09

## 2024-09-17 RX ADMIN — CARVEDILOL 12.5 MG: 12.5 TABLET, FILM COATED ORAL at 17:42

## 2024-09-17 RX ADMIN — EMPAGLIFLOZIN 10 MG: 10 TABLET, FILM COATED ORAL at 09:09

## 2024-09-17 RX ADMIN — CARVEDILOL 12.5 MG: 12.5 TABLET, FILM COATED ORAL at 06:29

## 2024-09-17 RX ADMIN — PENTOXIFYLLINE 400 MG: 400 TABLET, EXTENDED RELEASE ORAL at 09:09

## 2024-09-17 ASSESSMENT — PAIN DESCRIPTION - DESCRIPTORS
DESCRIPTORS: ACHING
DESCRIPTORS: BURNING;SORE
DESCRIPTORS: DISCOMFORT;CRAMPING

## 2024-09-17 ASSESSMENT — PAIN SCALES - GENERAL
PAINLEVEL_OUTOF10: 4
PAINLEVEL_OUTOF10: 7
PAINLEVEL_OUTOF10: 5
PAINLEVEL_OUTOF10: 3
PAINLEVEL_OUTOF10: 1
PAINLEVEL_OUTOF10: 6

## 2024-09-17 ASSESSMENT — PAIN DESCRIPTION - ORIENTATION
ORIENTATION: LEFT
ORIENTATION: MID;UPPER

## 2024-09-17 ASSESSMENT — PAIN DESCRIPTION - LOCATION
LOCATION: CHEST;HEAD
LOCATION: NECK
LOCATION: FACE;HAND

## 2024-09-17 ASSESSMENT — PAIN - FUNCTIONAL ASSESSMENT: PAIN_FUNCTIONAL_ASSESSMENT: ACTIVITIES ARE NOT PREVENTED

## 2024-09-18 LAB
ANION GAP SERPL CALCULATED.3IONS-SCNC: 12 MMOL/L (ref 9–16)
BUN SERPL-MCNC: 39 MG/DL (ref 8–23)
CALCIUM SERPL-MCNC: 9 MG/DL (ref 8.6–10.4)
CHLORIDE SERPL-SCNC: 98 MMOL/L (ref 98–107)
CO2 SERPL-SCNC: 22 MMOL/L (ref 20–31)
CREAT SERPL-MCNC: 1.7 MG/DL (ref 0.5–0.9)
ERYTHROCYTE [DISTWIDTH] IN BLOOD BY AUTOMATED COUNT: 16.1 % (ref 11.8–14.4)
GFR, ESTIMATED: 31 ML/MIN/1.73M2
GLUCOSE BLD-MCNC: 110 MG/DL (ref 65–105)
GLUCOSE SERPL-MCNC: 97 MG/DL (ref 74–99)
HCT VFR BLD AUTO: 35.7 % (ref 36.3–47.1)
HGB BLD-MCNC: 11.5 G/DL (ref 11.9–15.1)
MCH RBC QN AUTO: 28.3 PG (ref 25.2–33.5)
MCHC RBC AUTO-ENTMCNC: 32.2 G/DL (ref 28.4–34.8)
MCV RBC AUTO: 87.9 FL (ref 82.6–102.9)
MICROORGANISM SPEC CULT: NORMAL
NRBC BLD-RTO: 0 PER 100 WBC
PLATELET # BLD AUTO: 326 K/UL (ref 138–453)
PMV BLD AUTO: 10.8 FL (ref 8.1–13.5)
POTASSIUM SERPL-SCNC: 3.7 MMOL/L (ref 3.7–5.3)
RBC # BLD AUTO: 4.06 M/UL (ref 3.95–5.11)
SERVICE CMNT-IMP: NORMAL
SODIUM SERPL-SCNC: 132 MMOL/L (ref 136–145)
SPECIMEN DESCRIPTION: NORMAL
WBC OTHER # BLD: 11.2 K/UL (ref 3.5–11.3)

## 2024-09-18 PROCEDURE — 6370000000 HC RX 637 (ALT 250 FOR IP): Performed by: NURSE PRACTITIONER

## 2024-09-18 PROCEDURE — 2500000003 HC RX 250 WO HCPCS: Performed by: STUDENT IN AN ORGANIZED HEALTH CARE EDUCATION/TRAINING PROGRAM

## 2024-09-18 PROCEDURE — 2580000003 HC RX 258: Performed by: STUDENT IN AN ORGANIZED HEALTH CARE EDUCATION/TRAINING PROGRAM

## 2024-09-18 PROCEDURE — 2580000003 HC RX 258: Performed by: NURSE PRACTITIONER

## 2024-09-18 PROCEDURE — 6370000000 HC RX 637 (ALT 250 FOR IP): Performed by: STUDENT IN AN ORGANIZED HEALTH CARE EDUCATION/TRAINING PROGRAM

## 2024-09-18 PROCEDURE — 97530 THERAPEUTIC ACTIVITIES: CPT

## 2024-09-18 PROCEDURE — 82947 ASSAY GLUCOSE BLOOD QUANT: CPT

## 2024-09-18 PROCEDURE — 6370000000 HC RX 637 (ALT 250 FOR IP): Performed by: INTERNAL MEDICINE

## 2024-09-18 PROCEDURE — 94761 N-INVAS EAR/PLS OXIMETRY MLT: CPT

## 2024-09-18 PROCEDURE — 99232 SBSQ HOSP IP/OBS MODERATE 35: CPT | Performed by: STUDENT IN AN ORGANIZED HEALTH CARE EDUCATION/TRAINING PROGRAM

## 2024-09-18 PROCEDURE — 1200000000 HC SEMI PRIVATE

## 2024-09-18 PROCEDURE — 80048 BASIC METABOLIC PNL TOTAL CA: CPT

## 2024-09-18 PROCEDURE — 2700000000 HC OXYGEN THERAPY PER DAY

## 2024-09-18 PROCEDURE — 94640 AIRWAY INHALATION TREATMENT: CPT

## 2024-09-18 PROCEDURE — 36415 COLL VENOUS BLD VENIPUNCTURE: CPT

## 2024-09-18 PROCEDURE — 85027 COMPLETE CBC AUTOMATED: CPT

## 2024-09-18 PROCEDURE — 97535 SELF CARE MNGMENT TRAINING: CPT

## 2024-09-18 RX ORDER — SODIUM CHLORIDE 1 G/1
1 TABLET ORAL 2 TIMES DAILY WITH MEALS
Qty: 10 TABLET | Refills: 0 | Status: SHIPPED | OUTPATIENT
Start: 2024-09-18 | End: 2024-09-23

## 2024-09-18 RX ORDER — HYDROCODONE BITARTRATE AND ACETAMINOPHEN 5; 325 MG/1; MG/1
1 TABLET ORAL EVERY 6 HOURS PRN
Qty: 7 TABLET | Refills: 0 | Status: SHIPPED | OUTPATIENT
Start: 2024-09-18 | End: 2024-09-21

## 2024-09-18 RX ORDER — BUTALBITAL, ACETAMINOPHEN AND CAFFEINE 50; 325; 40 MG/1; MG/1; MG/1
1 TABLET ORAL EVERY 4 HOURS PRN
Status: DISCONTINUED | OUTPATIENT
Start: 2024-09-18 | End: 2024-09-20 | Stop reason: HOSPADM

## 2024-09-18 RX ORDER — BUTALBITAL, ACETAMINOPHEN AND CAFFEINE 50; 325; 40 MG/1; MG/1; MG/1
1 TABLET ORAL EVERY 4 HOURS PRN
Qty: 180 TABLET | Refills: 3 | Status: SHIPPED | OUTPATIENT
Start: 2024-09-18

## 2024-09-18 RX ADMIN — GUAIFENESIN 600 MG: 600 TABLET, EXTENDED RELEASE ORAL at 09:32

## 2024-09-18 RX ADMIN — EMPAGLIFLOZIN 10 MG: 10 TABLET, FILM COATED ORAL at 09:32

## 2024-09-18 RX ADMIN — IPRATROPIUM BROMIDE AND ALBUTEROL SULFATE 1 DOSE: 2.5; .5 SOLUTION RESPIRATORY (INHALATION) at 09:01

## 2024-09-18 RX ADMIN — BACITRACIN: 500 OINTMENT TOPICAL at 21:13

## 2024-09-18 RX ADMIN — APIXABAN 5 MG: 5 TABLET, FILM COATED ORAL at 09:32

## 2024-09-18 RX ADMIN — ATORVASTATIN CALCIUM 40 MG: 80 TABLET, FILM COATED ORAL at 21:14

## 2024-09-18 RX ADMIN — HYDROCODONE BITARTRATE AND ACETAMINOPHEN 2 TABLET: 5; 325 TABLET ORAL at 21:14

## 2024-09-18 RX ADMIN — SODIUM CHLORIDE, PRESERVATIVE FREE 10 ML: 5 INJECTION INTRAVENOUS at 09:33

## 2024-09-18 RX ADMIN — IPRATROPIUM BROMIDE AND ALBUTEROL SULFATE 1 DOSE: 2.5; .5 SOLUTION RESPIRATORY (INHALATION) at 21:19

## 2024-09-18 RX ADMIN — FERROUS SULFATE TAB EC 325 MG (65 MG FE EQUIVALENT) 325 MG: 325 (65 FE) TABLET DELAYED RESPONSE at 09:32

## 2024-09-18 RX ADMIN — SILVER SULFADIAZINE: 10 CREAM TOPICAL at 09:33

## 2024-09-18 RX ADMIN — BUDESONIDE AND FORMOTEROL FUMARATE DIHYDRATE 2 PUFF: 160; 4.5 AEROSOL RESPIRATORY (INHALATION) at 09:01

## 2024-09-18 RX ADMIN — PENTOXIFYLLINE 400 MG: 400 TABLET, EXTENDED RELEASE ORAL at 09:32

## 2024-09-18 RX ADMIN — SILVER SULFADIAZINE: 10 CREAM TOPICAL at 21:12

## 2024-09-18 RX ADMIN — HYDRALAZINE HYDROCHLORIDE 50 MG: 50 TABLET ORAL at 09:33

## 2024-09-18 RX ADMIN — SODIUM CHLORIDE, PRESERVATIVE FREE 10 ML: 5 INJECTION INTRAVENOUS at 21:00

## 2024-09-18 RX ADMIN — GUAIFENESIN 600 MG: 600 TABLET, EXTENDED RELEASE ORAL at 21:14

## 2024-09-18 RX ADMIN — SODIUM CHLORIDE 2 G: 1 TABLET ORAL at 18:00

## 2024-09-18 RX ADMIN — ACETAMINOPHEN 650 MG: 325 TABLET ORAL at 11:48

## 2024-09-18 RX ADMIN — APIXABAN 5 MG: 5 TABLET, FILM COATED ORAL at 21:14

## 2024-09-18 RX ADMIN — BUMETANIDE 2 MG: 1 TABLET ORAL at 09:33

## 2024-09-18 RX ADMIN — CARVEDILOL 12.5 MG: 12.5 TABLET, FILM COATED ORAL at 09:32

## 2024-09-18 RX ADMIN — PENTOXIFYLLINE 400 MG: 400 TABLET, EXTENDED RELEASE ORAL at 13:29

## 2024-09-18 RX ADMIN — PENTOXIFYLLINE 400 MG: 400 TABLET, EXTENDED RELEASE ORAL at 18:00

## 2024-09-18 RX ADMIN — SODIUM CHLORIDE 2 G: 1 TABLET ORAL at 09:32

## 2024-09-18 RX ADMIN — BUTALBITAL, ACETAMINOPHEN, AND CAFFEINE 1 TABLET: 50; 325; 40 TABLET ORAL at 11:48

## 2024-09-18 RX ADMIN — BACITRACIN: 500 OINTMENT TOPICAL at 09:33

## 2024-09-18 RX ADMIN — BUDESONIDE AND FORMOTEROL FUMARATE DIHYDRATE 2 PUFF: 160; 4.5 AEROSOL RESPIRATORY (INHALATION) at 21:19

## 2024-09-18 ASSESSMENT — PAIN - FUNCTIONAL ASSESSMENT
PAIN_FUNCTIONAL_ASSESSMENT: ACTIVITIES ARE NOT PREVENTED
PAIN_FUNCTIONAL_ASSESSMENT: PREVENTS OR INTERFERES SOME ACTIVE ACTIVITIES AND ADLS
PAIN_FUNCTIONAL_ASSESSMENT: ACTIVITIES ARE NOT PREVENTED

## 2024-09-18 ASSESSMENT — PAIN DESCRIPTION - DESCRIPTORS
DESCRIPTORS: ACHING;BURNING;THROBBING
DESCRIPTORS: SORE;PRESSURE
DESCRIPTORS: ACHING;BURNING;THROBBING

## 2024-09-18 ASSESSMENT — PAIN SCALES - GENERAL
PAINLEVEL_OUTOF10: 10
PAINLEVEL_OUTOF10: 3
PAINLEVEL_OUTOF10: 3
PAINLEVEL_OUTOF10: 6

## 2024-09-18 ASSESSMENT — PAIN DESCRIPTION - LOCATION
LOCATION: HAND
LOCATION: HEAD
LOCATION: HAND;FOOT

## 2024-09-18 ASSESSMENT — PAIN DESCRIPTION - ONSET: ONSET: ON-GOING

## 2024-09-18 ASSESSMENT — PAIN DESCRIPTION - PAIN TYPE: TYPE: ACUTE PAIN

## 2024-09-18 ASSESSMENT — PAIN DESCRIPTION - ORIENTATION
ORIENTATION: LEFT
ORIENTATION: LEFT;RIGHT

## 2024-09-19 LAB
GLUCOSE BLD-MCNC: 101 MG/DL (ref 65–105)
GLUCOSE BLD-MCNC: 101 MG/DL (ref 65–105)
GLUCOSE BLD-MCNC: 102 MG/DL (ref 65–105)

## 2024-09-19 PROCEDURE — 2580000003 HC RX 258: Performed by: NURSE PRACTITIONER

## 2024-09-19 PROCEDURE — 99239 HOSP IP/OBS DSCHRG MGMT >30: CPT | Performed by: STUDENT IN AN ORGANIZED HEALTH CARE EDUCATION/TRAINING PROGRAM

## 2024-09-19 PROCEDURE — 6370000000 HC RX 637 (ALT 250 FOR IP): Performed by: NURSE PRACTITIONER

## 2024-09-19 PROCEDURE — 6370000000 HC RX 637 (ALT 250 FOR IP): Performed by: INTERNAL MEDICINE

## 2024-09-19 PROCEDURE — 94760 N-INVAS EAR/PLS OXIMETRY 1: CPT

## 2024-09-19 PROCEDURE — 6370000000 HC RX 637 (ALT 250 FOR IP): Performed by: STUDENT IN AN ORGANIZED HEALTH CARE EDUCATION/TRAINING PROGRAM

## 2024-09-19 PROCEDURE — 97530 THERAPEUTIC ACTIVITIES: CPT

## 2024-09-19 PROCEDURE — 82947 ASSAY GLUCOSE BLOOD QUANT: CPT

## 2024-09-19 PROCEDURE — 94761 N-INVAS EAR/PLS OXIMETRY MLT: CPT

## 2024-09-19 PROCEDURE — 1200000000 HC SEMI PRIVATE

## 2024-09-19 PROCEDURE — 2700000000 HC OXYGEN THERAPY PER DAY

## 2024-09-19 PROCEDURE — 97110 THERAPEUTIC EXERCISES: CPT

## 2024-09-19 PROCEDURE — 94640 AIRWAY INHALATION TREATMENT: CPT

## 2024-09-19 RX ADMIN — ACETAMINOPHEN 650 MG: 325 TABLET ORAL at 15:39

## 2024-09-19 RX ADMIN — BACITRACIN: 500 OINTMENT TOPICAL at 22:02

## 2024-09-19 RX ADMIN — SODIUM CHLORIDE, PRESERVATIVE FREE 10 ML: 5 INJECTION INTRAVENOUS at 22:02

## 2024-09-19 RX ADMIN — SILVER SULFADIAZINE: 10 CREAM TOPICAL at 09:59

## 2024-09-19 RX ADMIN — EMPAGLIFLOZIN 10 MG: 10 TABLET, FILM COATED ORAL at 09:51

## 2024-09-19 RX ADMIN — APIXABAN 5 MG: 5 TABLET, FILM COATED ORAL at 09:50

## 2024-09-19 RX ADMIN — BUDESONIDE AND FORMOTEROL FUMARATE DIHYDRATE 2 PUFF: 160; 4.5 AEROSOL RESPIRATORY (INHALATION) at 19:44

## 2024-09-19 RX ADMIN — ACETAMINOPHEN 650 MG: 325 TABLET ORAL at 09:51

## 2024-09-19 RX ADMIN — Medication 3 MG: at 22:02

## 2024-09-19 RX ADMIN — CARVEDILOL 12.5 MG: 12.5 TABLET, FILM COATED ORAL at 09:50

## 2024-09-19 RX ADMIN — GUAIFENESIN 600 MG: 600 TABLET, EXTENDED RELEASE ORAL at 09:50

## 2024-09-19 RX ADMIN — BUTALBITAL, ACETAMINOPHEN, AND CAFFEINE 1 TABLET: 50; 325; 40 TABLET ORAL at 09:51

## 2024-09-19 RX ADMIN — BUMETANIDE 2 MG: 1 TABLET ORAL at 09:50

## 2024-09-19 RX ADMIN — BUTALBITAL, ACETAMINOPHEN, AND CAFFEINE 1 TABLET: 50; 325; 40 TABLET ORAL at 22:01

## 2024-09-19 RX ADMIN — CARVEDILOL 12.5 MG: 12.5 TABLET, FILM COATED ORAL at 17:22

## 2024-09-19 RX ADMIN — BUDESONIDE AND FORMOTEROL FUMARATE DIHYDRATE 2 PUFF: 160; 4.5 AEROSOL RESPIRATORY (INHALATION) at 08:24

## 2024-09-19 RX ADMIN — APIXABAN 5 MG: 5 TABLET, FILM COATED ORAL at 22:01

## 2024-09-19 RX ADMIN — BACITRACIN: 500 OINTMENT TOPICAL at 09:59

## 2024-09-19 RX ADMIN — POLYETHYLENE GLYCOL 3350 17 G: 17 POWDER, FOR SOLUTION ORAL at 09:50

## 2024-09-19 RX ADMIN — BUTALBITAL, ACETAMINOPHEN, AND CAFFEINE 1 TABLET: 50; 325; 40 TABLET ORAL at 15:39

## 2024-09-19 RX ADMIN — IPRATROPIUM BROMIDE AND ALBUTEROL SULFATE 1 DOSE: 2.5; .5 SOLUTION RESPIRATORY (INHALATION) at 08:24

## 2024-09-19 RX ADMIN — PENTOXIFYLLINE 400 MG: 400 TABLET, EXTENDED RELEASE ORAL at 12:59

## 2024-09-19 RX ADMIN — HYDROCODONE BITARTRATE AND ACETAMINOPHEN 2 TABLET: 5; 325 TABLET ORAL at 03:26

## 2024-09-19 RX ADMIN — PENTOXIFYLLINE 400 MG: 400 TABLET, EXTENDED RELEASE ORAL at 09:50

## 2024-09-19 RX ADMIN — PENTOXIFYLLINE 400 MG: 400 TABLET, EXTENDED RELEASE ORAL at 17:22

## 2024-09-19 RX ADMIN — SILVER SULFADIAZINE: 10 CREAM TOPICAL at 22:02

## 2024-09-19 RX ADMIN — SODIUM CHLORIDE, PRESERVATIVE FREE 10 ML: 5 INJECTION INTRAVENOUS at 09:50

## 2024-09-19 RX ADMIN — SODIUM CHLORIDE 2 G: 1 TABLET ORAL at 09:50

## 2024-09-19 RX ADMIN — HYDRALAZINE HYDROCHLORIDE 50 MG: 50 TABLET ORAL at 22:01

## 2024-09-19 RX ADMIN — ATORVASTATIN CALCIUM 40 MG: 80 TABLET, FILM COATED ORAL at 22:01

## 2024-09-19 RX ADMIN — GUAIFENESIN 600 MG: 600 TABLET, EXTENDED RELEASE ORAL at 22:01

## 2024-09-19 RX ADMIN — FERROUS SULFATE TAB EC 325 MG (65 MG FE EQUIVALENT) 325 MG: 325 (65 FE) TABLET DELAYED RESPONSE at 09:50

## 2024-09-19 RX ADMIN — SODIUM CHLORIDE 2 G: 1 TABLET ORAL at 17:22

## 2024-09-19 RX ADMIN — HYDRALAZINE HYDROCHLORIDE 50 MG: 50 TABLET ORAL at 06:04

## 2024-09-19 RX ADMIN — IPRATROPIUM BROMIDE AND ALBUTEROL SULFATE 1 DOSE: 2.5; .5 SOLUTION RESPIRATORY (INHALATION) at 19:44

## 2024-09-19 ASSESSMENT — PAIN - FUNCTIONAL ASSESSMENT
PAIN_FUNCTIONAL_ASSESSMENT: ACTIVITIES ARE NOT PREVENTED
PAIN_FUNCTIONAL_ASSESSMENT: PREVENTS OR INTERFERES SOME ACTIVE ACTIVITIES AND ADLS
PAIN_FUNCTIONAL_ASSESSMENT: PREVENTS OR INTERFERES SOME ACTIVE ACTIVITIES AND ADLS

## 2024-09-19 ASSESSMENT — PAIN DESCRIPTION - LOCATION
LOCATION: HAND
LOCATION: HEAD

## 2024-09-19 ASSESSMENT — PAIN DESCRIPTION - DESCRIPTORS
DESCRIPTORS: SORE
DESCRIPTORS: SORE
DESCRIPTORS: ACHING
DESCRIPTORS: BURNING;THROBBING;ACHING

## 2024-09-19 ASSESSMENT — PAIN SCALES - GENERAL
PAINLEVEL_OUTOF10: 9
PAINLEVEL_OUTOF10: 3
PAINLEVEL_OUTOF10: 3
PAINLEVEL_OUTOF10: 9
PAINLEVEL_OUTOF10: 3
PAINLEVEL_OUTOF10: 5

## 2024-09-19 ASSESSMENT — PAIN DESCRIPTION - ORIENTATION: ORIENTATION: LEFT

## 2024-09-20 VITALS
DIASTOLIC BLOOD PRESSURE: 85 MMHG | HEART RATE: 71 BPM | HEIGHT: 61 IN | TEMPERATURE: 98.3 F | RESPIRATION RATE: 18 BRPM | BODY MASS INDEX: 28.3 KG/M2 | OXYGEN SATURATION: 97 % | WEIGHT: 149.91 LBS | SYSTOLIC BLOOD PRESSURE: 175 MMHG

## 2024-09-20 LAB
GLUCOSE BLD-MCNC: 105 MG/DL (ref 65–105)
GLUCOSE BLD-MCNC: 110 MG/DL (ref 65–105)

## 2024-09-20 PROCEDURE — 6370000000 HC RX 637 (ALT 250 FOR IP): Performed by: STUDENT IN AN ORGANIZED HEALTH CARE EDUCATION/TRAINING PROGRAM

## 2024-09-20 PROCEDURE — 97110 THERAPEUTIC EXERCISES: CPT

## 2024-09-20 PROCEDURE — 2580000003 HC RX 258: Performed by: STUDENT IN AN ORGANIZED HEALTH CARE EDUCATION/TRAINING PROGRAM

## 2024-09-20 PROCEDURE — 99232 SBSQ HOSP IP/OBS MODERATE 35: CPT | Performed by: INTERNAL MEDICINE

## 2024-09-20 PROCEDURE — 6370000000 HC RX 637 (ALT 250 FOR IP): Performed by: INTERNAL MEDICINE

## 2024-09-20 PROCEDURE — 97530 THERAPEUTIC ACTIVITIES: CPT

## 2024-09-20 PROCEDURE — 94640 AIRWAY INHALATION TREATMENT: CPT

## 2024-09-20 PROCEDURE — 6370000000 HC RX 637 (ALT 250 FOR IP): Performed by: NURSE PRACTITIONER

## 2024-09-20 PROCEDURE — 2580000003 HC RX 258: Performed by: NURSE PRACTITIONER

## 2024-09-20 PROCEDURE — 2500000003 HC RX 250 WO HCPCS: Performed by: STUDENT IN AN ORGANIZED HEALTH CARE EDUCATION/TRAINING PROGRAM

## 2024-09-20 PROCEDURE — 94761 N-INVAS EAR/PLS OXIMETRY MLT: CPT

## 2024-09-20 PROCEDURE — 2700000000 HC OXYGEN THERAPY PER DAY

## 2024-09-20 PROCEDURE — 82947 ASSAY GLUCOSE BLOOD QUANT: CPT

## 2024-09-20 RX ADMIN — FERROUS SULFATE TAB EC 325 MG (65 MG FE EQUIVALENT) 325 MG: 325 (65 FE) TABLET DELAYED RESPONSE at 08:36

## 2024-09-20 RX ADMIN — GUAIFENESIN 600 MG: 600 TABLET, EXTENDED RELEASE ORAL at 08:36

## 2024-09-20 RX ADMIN — PENTOXIFYLLINE 400 MG: 400 TABLET, EXTENDED RELEASE ORAL at 17:18

## 2024-09-20 RX ADMIN — PENTOXIFYLLINE 400 MG: 400 TABLET, EXTENDED RELEASE ORAL at 08:37

## 2024-09-20 RX ADMIN — EMPAGLIFLOZIN 10 MG: 10 TABLET, FILM COATED ORAL at 08:36

## 2024-09-20 RX ADMIN — SODIUM CHLORIDE, PRESERVATIVE FREE 10 ML: 5 INJECTION INTRAVENOUS at 08:37

## 2024-09-20 RX ADMIN — HYDRALAZINE HYDROCHLORIDE 50 MG: 50 TABLET ORAL at 13:08

## 2024-09-20 RX ADMIN — IPRATROPIUM BROMIDE AND ALBUTEROL SULFATE 1 DOSE: 2.5; .5 SOLUTION RESPIRATORY (INHALATION) at 10:09

## 2024-09-20 RX ADMIN — APIXABAN 5 MG: 5 TABLET, FILM COATED ORAL at 08:35

## 2024-09-20 RX ADMIN — SILVER SULFADIAZINE: 10 CREAM TOPICAL at 08:36

## 2024-09-20 RX ADMIN — POLYETHYLENE GLYCOL 3350 17 G: 17 POWDER, FOR SOLUTION ORAL at 08:36

## 2024-09-20 RX ADMIN — CARVEDILOL 12.5 MG: 12.5 TABLET, FILM COATED ORAL at 17:18

## 2024-09-20 RX ADMIN — BACITRACIN: 500 OINTMENT TOPICAL at 08:36

## 2024-09-20 RX ADMIN — HYDRALAZINE HYDROCHLORIDE 50 MG: 50 TABLET ORAL at 06:25

## 2024-09-20 RX ADMIN — BUTALBITAL, ACETAMINOPHEN, AND CAFFEINE 1 TABLET: 50; 325; 40 TABLET ORAL at 12:46

## 2024-09-20 RX ADMIN — BUMETANIDE 2 MG: 1 TABLET ORAL at 08:36

## 2024-09-20 RX ADMIN — SODIUM CHLORIDE 2 G: 1 TABLET ORAL at 17:18

## 2024-09-20 RX ADMIN — BUTALBITAL, ACETAMINOPHEN, AND CAFFEINE 1 TABLET: 50; 325; 40 TABLET ORAL at 08:45

## 2024-09-20 RX ADMIN — CARVEDILOL 12.5 MG: 12.5 TABLET, FILM COATED ORAL at 08:35

## 2024-09-20 RX ADMIN — PENTOXIFYLLINE 400 MG: 400 TABLET, EXTENDED RELEASE ORAL at 12:20

## 2024-09-20 RX ADMIN — SODIUM CHLORIDE 2 G: 1 TABLET ORAL at 08:35

## 2024-09-20 ASSESSMENT — ENCOUNTER SYMPTOMS
COUGH: 0
CHEST TIGHTNESS: 0
APNEA: 0
ABDOMINAL DISTENTION: 0
ABDOMINAL PAIN: 0
SHORTNESS OF BREATH: 1
WHEEZING: 0

## 2024-09-20 ASSESSMENT — PAIN SCALES - GENERAL
PAINLEVEL_OUTOF10: 8
PAINLEVEL_OUTOF10: 9

## 2024-09-20 ASSESSMENT — PAIN DESCRIPTION - DESCRIPTORS
DESCRIPTORS: ACHING
DESCRIPTORS: ACHING

## 2024-09-20 ASSESSMENT — PAIN DESCRIPTION - LOCATION
LOCATION: HEAD
LOCATION: HEAD